# Patient Record
Sex: MALE | Race: WHITE | NOT HISPANIC OR LATINO | Employment: OTHER | URBAN - METROPOLITAN AREA
[De-identification: names, ages, dates, MRNs, and addresses within clinical notes are randomized per-mention and may not be internally consistent; named-entity substitution may affect disease eponyms.]

---

## 2017-01-15 DIAGNOSIS — E87.1 HYPONATREMIA: ICD-10-CM

## 2017-01-17 RX ORDER — FLUDROCORTISONE ACETATE 0.1 MG/1
TABLET ORAL
Qty: 90 TABLET | Refills: 0 | Status: ON HOLD | OUTPATIENT
Start: 2017-01-17 | End: 2017-04-03

## 2017-03-22 ENCOUNTER — DOCUMENTATION (OUTPATIENT)
Dept: FAMILY MEDICINE CLINIC | Facility: CLINIC | Age: 82
End: 2017-03-22

## 2017-03-22 ENCOUNTER — OUTSIDE FACILITY SERVICE (OUTPATIENT)
Dept: FAMILY MEDICINE CLINIC | Facility: CLINIC | Age: 82
End: 2017-03-22

## 2017-03-22 NOTE — PROGRESS NOTES
"Subjective   Rio Chao Jr. is a 85 y.o. male.     History of Present Illness   I received a text message from Caryn the patient's daughter.  She was concerned that her father (the patient) has fever and upper respiratory infection over a period of three days with no improvement.  An appointment was made at 4:30 to be seen but no transportation was available.  A \"house [place]\" [place] [facility] [place].  I arrived at [facility] [place] for a on site MD visit.  Upon arrival his caretaker Monica was present and presented me with a current set of vitals (details below).  Mr. Chao describes fever, chills and cough that is now productive.  He reports that his symptoms started about three days ago.  He denies associated rashes, shortness of air or chest pain.  He denies n/v/d or abdominal pain.  He reports sick contacts on his floor.  He also reports that he is grieving the loss of a dear female friend.  He just found out about her death today.    Review of Systems   Constitutional: Positive for chills and fever. Negative for diaphoresis.   HENT: Positive for congestion and postnasal drip. Negative for ear pain, facial swelling, nosebleeds, rhinorrhea, sinus pressure, sore throat and trouble swallowing.    Eyes: Negative for discharge and redness.   Respiratory: Positive for cough and wheezing. Negative for choking and shortness of breath.    Cardiovascular: Negative for chest pain and palpitations.   Gastrointestinal: Negative for abdominal pain, diarrhea, nausea and vomiting.   Genitourinary: Negative for dysuria.   Musculoskeletal: Positive for back pain.   Skin: Negative for rash.   Neurological: Negative for dizziness, speech difficulty and light-headedness.   Psychiatric/Behavioral: The patient is nervous/anxious.      Objective    Vitals:   #  /60  HR 72  Temp 100.5 (TM)  RR 18  Pulse Ox: 94% (RA)  Physical Exam   Constitutional: He is oriented " to person, place, and time. He appears well-developed and well-nourished.   HENT:   Head: Normocephalic.   Right Ear: External ear normal. Decreased hearing is noted.   Left Ear: External ear normal. Decreased hearing is noted.   Nose: Mucosal edema and rhinorrhea present. Right sinus exhibits no maxillary sinus tenderness. Left sinus exhibits no maxillary sinus tenderness.   Mouth/Throat: Uvula is midline and mucous membranes are normal. No oral lesions. Posterior oropharyngeal erythema present.   Eyes: Conjunctivae, EOM and lids are normal. Pupils are equal, round, and reactive to light. Right eye exhibits no discharge. Left eye exhibits no discharge. Right conjunctiva is not injected. Left conjunctiva is not injected. No scleral icterus.   Neck: Phonation normal. Neck supple. No thyromegaly present.   Cardiovascular: Normal rate, regular rhythm and normal heart sounds.    Pulmonary/Chest: No respiratory distress. He has wheezes (bronchial with croupy cough appreciated).   Abdominal: Soft. Bowel sounds are normal. There is no tenderness.   Musculoskeletal: Normal range of motion.   Lymphadenopathy:     He has no cervical adenopathy.   Neurological: He is alert and oriented to person, place, and time.   Skin: Skin is warm. No rash noted.   Psychiatric: His speech is normal and behavior is normal. Judgment and thought content normal. His mood appears anxious. Cognition and memory are impaired.   Tearful when discussing the death of his friend   Vitals reviewed.    Assessment/Plan   1) Community Acquired Pneumonia  -Levaquin 750 mg po once daily # 7 days  -Albuterol MDI 2 puffs every 4 hours while awake  -Daily incentive spirometry  -Adequate daily hydration  -Continue with vitals BID and communicate sudden changes or concerns  -Go to the ED with any acute respiratory changes.  -Follow up in the office next week    2) Grieving  -Start counseling and ministerial support  -Monitor sleep / wake cycle  -consider low dose  trazadone  -Re-assess in a few days when daughter gets back into town.

## 2017-03-24 ENCOUNTER — HOSPITAL ENCOUNTER (INPATIENT)
Facility: HOSPITAL | Age: 82
LOS: 9 days | Discharge: NURSING, REHAB OR LONG-TERM CARE | End: 2017-04-03
Attending: EMERGENCY MEDICINE | Admitting: INTERNAL MEDICINE

## 2017-03-24 ENCOUNTER — APPOINTMENT (OUTPATIENT)
Dept: GENERAL RADIOLOGY | Facility: HOSPITAL | Age: 82
End: 2017-03-24

## 2017-03-24 DIAGNOSIS — E87.1 HYPONATREMIA: ICD-10-CM

## 2017-03-24 DIAGNOSIS — Z74.09 IMPAIRED MOBILITY AND ADLS: ICD-10-CM

## 2017-03-24 DIAGNOSIS — I50.22 CHRONIC SYSTOLIC CONGESTIVE HEART FAILURE (HCC): ICD-10-CM

## 2017-03-24 DIAGNOSIS — Z78.9 IMPAIRED MOBILITY AND ADLS: ICD-10-CM

## 2017-03-24 DIAGNOSIS — R50.9 ACUTE FEBRILE ILLNESS: ICD-10-CM

## 2017-03-24 DIAGNOSIS — R53.1 GENERALIZED WEAKNESS: Primary | ICD-10-CM

## 2017-03-24 DIAGNOSIS — Z74.09 IMPAIRED FUNCTIONAL MOBILITY, BALANCE, GAIT, AND ENDURANCE: ICD-10-CM

## 2017-03-24 LAB
ALBUMIN SERPL-MCNC: 4.1 G/DL (ref 3.2–4.8)
ALBUMIN/GLOB SERPL: 1.2 G/DL (ref 1.5–2.5)
ALP SERPL-CCNC: 99 U/L (ref 25–100)
ALT SERPL W P-5'-P-CCNC: 19 U/L (ref 7–40)
ANION GAP SERPL CALCULATED.3IONS-SCNC: 6 MMOL/L (ref 3–11)
APTT PPP: 31.7 SECONDS (ref 24–31)
AST SERPL-CCNC: 46 U/L (ref 0–33)
BACTERIA UR QL AUTO: ABNORMAL /HPF
BASOPHILS # BLD AUTO: 0.03 10*3/MM3 (ref 0–0.2)
BASOPHILS NFR BLD AUTO: 0.2 % (ref 0–1)
BILIRUB SERPL-MCNC: 0.6 MG/DL (ref 0.3–1.2)
BILIRUB UR QL STRIP: NEGATIVE
BNP SERPL-MCNC: 395 PG/ML (ref 0–100)
BUN BLD-MCNC: 32 MG/DL (ref 9–23)
BUN/CREAT SERPL: 26.7 (ref 7–25)
CALCIUM SPEC-SCNC: 10 MG/DL (ref 8.7–10.4)
CHLORIDE SERPL-SCNC: 97 MMOL/L (ref 99–109)
CLARITY UR: ABNORMAL
CO2 SERPL-SCNC: 30 MMOL/L (ref 20–31)
COLOR UR: YELLOW
CREAT BLD-MCNC: 1.2 MG/DL (ref 0.6–1.3)
DEPRECATED RDW RBC AUTO: 50.4 FL (ref 37–54)
EOSINOPHIL # BLD AUTO: 0.04 10*3/MM3 (ref 0.1–0.3)
EOSINOPHIL NFR BLD AUTO: 0.2 % (ref 0–3)
ERYTHROCYTE [DISTWIDTH] IN BLOOD BY AUTOMATED COUNT: 14.2 % (ref 11.3–14.5)
GFR SERPL CREATININE-BSD FRML MDRD: 58 ML/MIN/1.73
GLOBULIN UR ELPH-MCNC: 3.5 GM/DL
GLUCOSE BLD-MCNC: 108 MG/DL (ref 70–100)
GLUCOSE UR STRIP-MCNC: NEGATIVE MG/DL
HCT VFR BLD AUTO: 37.5 % (ref 38.9–50.9)
HGB BLD-MCNC: 12.2 G/DL (ref 13.1–17.5)
HGB UR QL STRIP.AUTO: ABNORMAL
HYALINE CASTS UR QL AUTO: ABNORMAL /LPF
IMM GRANULOCYTES # BLD: 0.07 10*3/MM3 (ref 0–0.03)
IMM GRANULOCYTES NFR BLD: 0.4 % (ref 0–0.6)
INR PPP: 1.12
KETONES UR QL STRIP: NEGATIVE
LEUKOCYTE ESTERASE UR QL STRIP.AUTO: NEGATIVE
LYMPHOCYTES # BLD AUTO: 3.44 10*3/MM3 (ref 0.6–4.8)
LYMPHOCYTES NFR BLD AUTO: 18.3 % (ref 24–44)
MCH RBC QN AUTO: 31.6 PG (ref 27–31)
MCHC RBC AUTO-ENTMCNC: 32.5 G/DL (ref 32–36)
MCV RBC AUTO: 97.2 FL (ref 80–99)
MONOCYTES # BLD AUTO: 1.44 10*3/MM3 (ref 0–1)
MONOCYTES NFR BLD AUTO: 7.7 % (ref 0–12)
NEUTROPHILS # BLD AUTO: 13.74 10*3/MM3 (ref 1.5–8.3)
NEUTROPHILS NFR BLD AUTO: 73.2 % (ref 41–71)
NITRITE UR QL STRIP: NEGATIVE
PH UR STRIP.AUTO: <=5 [PH] (ref 5–8)
PLATELET # BLD AUTO: 296 10*3/MM3 (ref 150–450)
PMV BLD AUTO: 9.5 FL (ref 6–12)
POTASSIUM BLD-SCNC: 4.4 MMOL/L (ref 3.5–5.5)
PROT SERPL-MCNC: 7.6 G/DL (ref 5.7–8.2)
PROT UR QL STRIP: ABNORMAL
PROTHROMBIN TIME: 12.2 SECONDS (ref 9.6–11.5)
RBC # BLD AUTO: 3.86 10*6/MM3 (ref 4.2–5.76)
RBC # UR: ABNORMAL /HPF
REF LAB TEST METHOD: ABNORMAL
SODIUM BLD-SCNC: 133 MMOL/L (ref 132–146)
SP GR UR STRIP: 1.02 (ref 1–1.03)
SQUAMOUS #/AREA URNS HPF: ABNORMAL /HPF
TROPONIN I SERPL-MCNC: 0.04 NG/ML (ref 0–0.07)
UROBILINOGEN UR QL STRIP: ABNORMAL
WBC NRBC COR # BLD: 18.76 10*3/MM3 (ref 3.5–10.8)
WBC UR QL AUTO: ABNORMAL /HPF

## 2017-03-24 PROCEDURE — 85025 COMPLETE CBC W/AUTO DIFF WBC: CPT | Performed by: EMERGENCY MEDICINE

## 2017-03-24 PROCEDURE — 85610 PROTHROMBIN TIME: CPT | Performed by: EMERGENCY MEDICINE

## 2017-03-24 PROCEDURE — 25010000003 CEFTRIAXONE PER 250 MG: Performed by: EMERGENCY MEDICINE

## 2017-03-24 PROCEDURE — 94760 N-INVAS EAR/PLS OXIMETRY 1: CPT

## 2017-03-24 PROCEDURE — 93005 ELECTROCARDIOGRAM TRACING: CPT | Performed by: EMERGENCY MEDICINE

## 2017-03-24 PROCEDURE — 80053 COMPREHEN METABOLIC PANEL: CPT | Performed by: EMERGENCY MEDICINE

## 2017-03-24 PROCEDURE — 81001 URINALYSIS AUTO W/SCOPE: CPT | Performed by: EMERGENCY MEDICINE

## 2017-03-24 PROCEDURE — 83605 ASSAY OF LACTIC ACID: CPT | Performed by: EMERGENCY MEDICINE

## 2017-03-24 PROCEDURE — 71010 HC CHEST PA OR AP: CPT

## 2017-03-24 PROCEDURE — 83880 ASSAY OF NATRIURETIC PEPTIDE: CPT | Performed by: EMERGENCY MEDICINE

## 2017-03-24 PROCEDURE — 84484 ASSAY OF TROPONIN QUANT: CPT

## 2017-03-24 PROCEDURE — 99285 EMERGENCY DEPT VISIT HI MDM: CPT

## 2017-03-24 PROCEDURE — 94640 AIRWAY INHALATION TREATMENT: CPT

## 2017-03-24 PROCEDURE — 87040 BLOOD CULTURE FOR BACTERIA: CPT | Performed by: EMERGENCY MEDICINE

## 2017-03-24 PROCEDURE — 85730 THROMBOPLASTIN TIME PARTIAL: CPT | Performed by: EMERGENCY MEDICINE

## 2017-03-24 RX ORDER — IPRATROPIUM BROMIDE AND ALBUTEROL SULFATE 2.5; .5 MG/3ML; MG/3ML
3 SOLUTION RESPIRATORY (INHALATION) ONCE
Status: COMPLETED | OUTPATIENT
Start: 2017-03-24 | End: 2017-03-24

## 2017-03-24 RX ORDER — ACETAMINOPHEN 500 MG
1000 TABLET ORAL ONCE
Status: COMPLETED | OUTPATIENT
Start: 2017-03-24 | End: 2017-03-25

## 2017-03-24 RX ORDER — SODIUM CHLORIDE 0.9 % (FLUSH) 0.9 %
10 SYRINGE (ML) INJECTION AS NEEDED
Status: DISCONTINUED | OUTPATIENT
Start: 2017-03-24 | End: 2017-04-03 | Stop reason: HOSPADM

## 2017-03-24 RX ORDER — IPRATROPIUM BROMIDE AND ALBUTEROL SULFATE 2.5; .5 MG/3ML; MG/3ML
SOLUTION RESPIRATORY (INHALATION)
Status: DISPENSED
Start: 2017-03-24 | End: 2017-03-25

## 2017-03-24 RX ORDER — CEFTRIAXONE SODIUM 1 G/50ML
1 INJECTION, SOLUTION INTRAVENOUS ONCE
Status: COMPLETED | OUTPATIENT
Start: 2017-03-24 | End: 2017-03-25

## 2017-03-24 RX ADMIN — CEFTRIAXONE SODIUM 1 G: 1 INJECTION, SOLUTION INTRAVENOUS at 23:41

## 2017-03-24 RX ADMIN — IPRATROPIUM BROMIDE AND ALBUTEROL SULFATE 3 ML: .5; 3 SOLUTION RESPIRATORY (INHALATION) at 22:28

## 2017-03-25 PROBLEM — R50.9 ACUTE FEBRILE ILLNESS: Status: ACTIVE | Noted: 2017-03-25

## 2017-03-25 PROBLEM — R26.9 GAIT DISORDER: Chronic | Status: ACTIVE | Noted: 2017-03-25

## 2017-03-25 PROBLEM — R65.10 SIRS (SYSTEMIC INFLAMMATORY RESPONSE SYNDROME) (HCC): Status: ACTIVE | Noted: 2017-03-25

## 2017-03-25 LAB — D-LACTATE SERPL-SCNC: 1.5 MMOL/L (ref 0.5–2)

## 2017-03-25 PROCEDURE — 25010000002 AZITHROMYCIN: Performed by: NURSE PRACTITIONER

## 2017-03-25 PROCEDURE — 87502 INFLUENZA DNA AMP PROBE: CPT | Performed by: EMERGENCY MEDICINE

## 2017-03-25 PROCEDURE — 99223 1ST HOSP IP/OBS HIGH 75: CPT | Performed by: INTERNAL MEDICINE

## 2017-03-25 PROCEDURE — 94640 AIRWAY INHALATION TREATMENT: CPT

## 2017-03-25 PROCEDURE — 25010000002 AZITHROMYCIN: Performed by: EMERGENCY MEDICINE

## 2017-03-25 PROCEDURE — 94799 UNLISTED PULMONARY SVC/PX: CPT

## 2017-03-25 PROCEDURE — 25010000002 ENOXAPARIN PER 10 MG

## 2017-03-25 PROCEDURE — 25010000003 CEFTRIAXONE PER 250 MG: Performed by: NURSE PRACTITIONER

## 2017-03-25 RX ORDER — PANTOPRAZOLE SODIUM 40 MG/1
40 TABLET, DELAYED RELEASE ORAL
Status: DISCONTINUED | OUTPATIENT
Start: 2017-03-25 | End: 2017-04-03 | Stop reason: HOSPADM

## 2017-03-25 RX ORDER — IPRATROPIUM BROMIDE AND ALBUTEROL SULFATE 2.5; .5 MG/3ML; MG/3ML
3 SOLUTION RESPIRATORY (INHALATION) EVERY 4 HOURS PRN
Status: DISCONTINUED | OUTPATIENT
Start: 2017-03-25 | End: 2017-04-03 | Stop reason: HOSPADM

## 2017-03-25 RX ORDER — ASPIRIN 81 MG/1
81 TABLET, CHEWABLE ORAL DAILY
Status: DISCONTINUED | OUTPATIENT
Start: 2017-03-25 | End: 2017-04-03 | Stop reason: HOSPADM

## 2017-03-25 RX ORDER — BUMETANIDE 1 MG/1
0.5 TABLET ORAL DAILY PRN
Status: DISCONTINUED | OUTPATIENT
Start: 2017-03-25 | End: 2017-03-25

## 2017-03-25 RX ORDER — ACETAMINOPHEN 325 MG/1
650 TABLET ORAL EVERY 6 HOURS PRN
Status: DISCONTINUED | OUTPATIENT
Start: 2017-03-25 | End: 2017-04-03 | Stop reason: HOSPADM

## 2017-03-25 RX ORDER — POTASSIUM CHLORIDE 1.5 G/1.77G
20 POWDER, FOR SOLUTION ORAL DAILY
Status: DISCONTINUED | OUTPATIENT
Start: 2017-03-25 | End: 2017-04-03 | Stop reason: HOSPADM

## 2017-03-25 RX ORDER — LANOLIN ALCOHOL/MO/W.PET/CERES
800 CREAM (GRAM) TOPICAL DAILY
Status: DISCONTINUED | OUTPATIENT
Start: 2017-03-25 | End: 2017-04-03 | Stop reason: HOSPADM

## 2017-03-25 RX ORDER — FLUDROCORTISONE ACETATE 0.1 MG/1
100 TABLET ORAL DAILY
Status: DISCONTINUED | OUTPATIENT
Start: 2017-03-25 | End: 2017-03-29

## 2017-03-25 RX ORDER — SPIRONOLACTONE 50 MG/1
50 TABLET, FILM COATED ORAL DAILY
Status: DISCONTINUED | OUTPATIENT
Start: 2017-03-25 | End: 2017-03-25

## 2017-03-25 RX ORDER — LEVOTHYROXINE SODIUM 0.05 MG/1
50 TABLET ORAL DAILY
Status: DISCONTINUED | OUTPATIENT
Start: 2017-03-25 | End: 2017-04-03 | Stop reason: HOSPADM

## 2017-03-25 RX ORDER — CARVEDILOL 3.12 MG/1
3.12 TABLET ORAL 2 TIMES DAILY WITH MEALS
Status: DISCONTINUED | OUTPATIENT
Start: 2017-03-25 | End: 2017-04-03 | Stop reason: HOSPADM

## 2017-03-25 RX ORDER — IPRATROPIUM BROMIDE AND ALBUTEROL SULFATE 2.5; .5 MG/3ML; MG/3ML
3 SOLUTION RESPIRATORY (INHALATION)
Status: DISCONTINUED | OUTPATIENT
Start: 2017-03-25 | End: 2017-04-03

## 2017-03-25 RX ORDER — POLYETHYLENE GLYCOL 3350 17 G/17G
17 POWDER, FOR SOLUTION ORAL ONCE
Status: COMPLETED | OUTPATIENT
Start: 2017-03-25 | End: 2017-03-25

## 2017-03-25 RX ORDER — CEFTRIAXONE SODIUM 1 G/50ML
1 INJECTION, SOLUTION INTRAVENOUS EVERY 24 HOURS
Status: DISCONTINUED | OUTPATIENT
Start: 2017-03-25 | End: 2017-03-27

## 2017-03-25 RX ORDER — LISINOPRIL 2.5 MG/1
2.5 TABLET ORAL DAILY
Status: DISCONTINUED | OUTPATIENT
Start: 2017-03-25 | End: 2017-03-25

## 2017-03-25 RX ADMIN — ACETAMINOPHEN 1000 MG: 500 TABLET, FILM COATED ORAL at 00:33

## 2017-03-25 RX ADMIN — IPRATROPIUM BROMIDE AND ALBUTEROL SULFATE 3 ML: .5; 3 SOLUTION RESPIRATORY (INHALATION) at 19:47

## 2017-03-25 RX ADMIN — IPRATROPIUM BROMIDE AND ALBUTEROL SULFATE 3 ML: .5; 3 SOLUTION RESPIRATORY (INHALATION) at 10:48

## 2017-03-25 RX ADMIN — AZITHROMYCIN 500 MG: 500 INJECTION, POWDER, LYOPHILIZED, FOR SOLUTION INTRAVENOUS at 00:32

## 2017-03-25 RX ADMIN — CEFTRIAXONE SODIUM 1 G: 1 INJECTION, SOLUTION INTRAVENOUS at 20:37

## 2017-03-25 RX ADMIN — IPRATROPIUM BROMIDE AND ALBUTEROL SULFATE 3 ML: .5; 3 SOLUTION RESPIRATORY (INHALATION) at 07:36

## 2017-03-25 RX ADMIN — IPRATROPIUM BROMIDE AND ALBUTEROL SULFATE 3 ML: .5; 3 SOLUTION RESPIRATORY (INHALATION) at 14:49

## 2017-03-25 RX ADMIN — ACETAMINOPHEN 650 MG: 325 TABLET, FILM COATED ORAL at 21:40

## 2017-03-25 RX ADMIN — AZITHROMYCIN 500 MG: 500 INJECTION, POWDER, LYOPHILIZED, FOR SOLUTION INTRAVENOUS at 21:40

## 2017-03-25 RX ADMIN — POTASSIUM CHLORIDE 20 MEQ: 1.5 POWDER, FOR SOLUTION ORAL at 10:38

## 2017-03-25 RX ADMIN — SODIUM CHLORIDE 1000 ML: 9 INJECTION, SOLUTION INTRAVENOUS at 01:08

## 2017-03-25 RX ADMIN — FLUDROCORTISONE ACETATE 100 MCG: 0.1 TABLET ORAL at 10:45

## 2017-03-25 RX ADMIN — ASPIRIN 81 MG: 81 TABLET, CHEWABLE ORAL at 10:45

## 2017-03-25 RX ADMIN — PANTOPRAZOLE SODIUM 40 MG: 40 TABLET, DELAYED RELEASE ORAL at 10:39

## 2017-03-25 RX ADMIN — ENOXAPARIN SODIUM 40 MG: 40 INJECTION SUBCUTANEOUS at 10:48

## 2017-03-25 RX ADMIN — POLYETHYLENE GLYCOL 3350 17 G: 17 POWDER, FOR SOLUTION ORAL at 21:40

## 2017-03-25 RX ADMIN — ACETAMINOPHEN 800 MCG: 400 TABLET ORAL at 10:45

## 2017-03-25 RX ADMIN — LEVOTHYROXINE SODIUM 50 MCG: 50 TABLET ORAL at 10:38

## 2017-03-26 LAB
ANION GAP SERPL CALCULATED.3IONS-SCNC: 8 MMOL/L (ref 3–11)
BUN BLD-MCNC: 18 MG/DL (ref 9–23)
BUN/CREAT SERPL: 22.5 (ref 7–25)
CALCIUM SPEC-SCNC: 9.6 MG/DL (ref 8.7–10.4)
CHLORIDE SERPL-SCNC: 99 MMOL/L (ref 99–109)
CO2 SERPL-SCNC: 26 MMOL/L (ref 20–31)
CREAT BLD-MCNC: 0.8 MG/DL (ref 0.6–1.3)
DEPRECATED RDW RBC AUTO: 51.3 FL (ref 37–54)
ERYTHROCYTE [DISTWIDTH] IN BLOOD BY AUTOMATED COUNT: 14.4 % (ref 11.3–14.5)
FLUAV SUBTYP SPEC NAA+PROBE: NOT DETECTED
FLUBV RNA ISLT QL NAA+PROBE: DETECTED
GFR SERPL CREATININE-BSD FRML MDRD: 92 ML/MIN/1.73
GLUCOSE BLD-MCNC: 90 MG/DL (ref 70–100)
HCT VFR BLD AUTO: 33.1 % (ref 38.9–50.9)
HGB BLD-MCNC: 10.6 G/DL (ref 13.1–17.5)
MCH RBC QN AUTO: 31.2 PG (ref 27–31)
MCHC RBC AUTO-ENTMCNC: 32 G/DL (ref 32–36)
MCV RBC AUTO: 97.4 FL (ref 80–99)
PLATELET # BLD AUTO: 272 10*3/MM3 (ref 150–450)
PMV BLD AUTO: 9.5 FL (ref 6–12)
POTASSIUM BLD-SCNC: 4.6 MMOL/L (ref 3.5–5.5)
RBC # BLD AUTO: 3.4 10*6/MM3 (ref 4.2–5.76)
SODIUM BLD-SCNC: 133 MMOL/L (ref 132–146)
WBC NRBC COR # BLD: 13.34 10*3/MM3 (ref 3.5–10.8)

## 2017-03-26 PROCEDURE — 25010000003 CEFTRIAXONE PER 250 MG: Performed by: NURSE PRACTITIONER

## 2017-03-26 PROCEDURE — 25010000002 AZITHROMYCIN: Performed by: NURSE PRACTITIONER

## 2017-03-26 PROCEDURE — 85027 COMPLETE CBC AUTOMATED: CPT | Performed by: INTERNAL MEDICINE

## 2017-03-26 PROCEDURE — G8979 MOBILITY GOAL STATUS: HCPCS

## 2017-03-26 PROCEDURE — 94640 AIRWAY INHALATION TREATMENT: CPT

## 2017-03-26 PROCEDURE — 94799 UNLISTED PULMONARY SVC/PX: CPT

## 2017-03-26 PROCEDURE — 25010000002 ENOXAPARIN PER 10 MG

## 2017-03-26 PROCEDURE — 80048 BASIC METABOLIC PNL TOTAL CA: CPT | Performed by: INTERNAL MEDICINE

## 2017-03-26 PROCEDURE — 97162 PT EVAL MOD COMPLEX 30 MIN: CPT

## 2017-03-26 PROCEDURE — 97166 OT EVAL MOD COMPLEX 45 MIN: CPT

## 2017-03-26 PROCEDURE — G8978 MOBILITY CURRENT STATUS: HCPCS

## 2017-03-26 PROCEDURE — 99233 SBSQ HOSP IP/OBS HIGH 50: CPT | Performed by: INTERNAL MEDICINE

## 2017-03-26 RX ORDER — OSELTAMIVIR PHOSPHATE 75 MG/1
75 CAPSULE ORAL EVERY 12 HOURS SCHEDULED
Status: COMPLETED | OUTPATIENT
Start: 2017-03-26 | End: 2017-03-30

## 2017-03-26 RX ORDER — HEPARIN SODIUM 5000 [USP'U]/ML
5000 INJECTION, SOLUTION INTRAVENOUS; SUBCUTANEOUS EVERY 8 HOURS SCHEDULED
Status: DISCONTINUED | OUTPATIENT
Start: 2017-03-26 | End: 2017-03-26

## 2017-03-26 RX ADMIN — ENOXAPARIN SODIUM 40 MG: 40 INJECTION SUBCUTANEOUS at 08:19

## 2017-03-26 RX ADMIN — OSELTAMIVIR PHOSPHATE 75 MG: 75 CAPSULE ORAL at 19:47

## 2017-03-26 RX ADMIN — LEVOTHYROXINE SODIUM 50 MCG: 50 TABLET ORAL at 05:15

## 2017-03-26 RX ADMIN — PANTOPRAZOLE SODIUM 40 MG: 40 TABLET, DELAYED RELEASE ORAL at 05:15

## 2017-03-26 RX ADMIN — OSELTAMIVIR PHOSPHATE 75 MG: 75 CAPSULE ORAL at 12:37

## 2017-03-26 RX ADMIN — ACETAMINOPHEN 650 MG: 325 TABLET, FILM COATED ORAL at 18:14

## 2017-03-26 RX ADMIN — ASPIRIN 81 MG: 81 TABLET, CHEWABLE ORAL at 08:19

## 2017-03-26 RX ADMIN — IPRATROPIUM BROMIDE AND ALBUTEROL SULFATE 3 ML: .5; 3 SOLUTION RESPIRATORY (INHALATION) at 00:28

## 2017-03-26 RX ADMIN — AZITHROMYCIN 500 MG: 500 INJECTION, POWDER, LYOPHILIZED, FOR SOLUTION INTRAVENOUS at 19:47

## 2017-03-26 RX ADMIN — IPRATROPIUM BROMIDE AND ALBUTEROL SULFATE 3 ML: .5; 3 SOLUTION RESPIRATORY (INHALATION) at 23:03

## 2017-03-26 RX ADMIN — IPRATROPIUM BROMIDE AND ALBUTEROL SULFATE 3 ML: .5; 3 SOLUTION RESPIRATORY (INHALATION) at 03:43

## 2017-03-26 RX ADMIN — IPRATROPIUM BROMIDE AND ALBUTEROL SULFATE 3 ML: .5; 3 SOLUTION RESPIRATORY (INHALATION) at 15:08

## 2017-03-26 RX ADMIN — IPRATROPIUM BROMIDE AND ALBUTEROL SULFATE 3 ML: .5; 3 SOLUTION RESPIRATORY (INHALATION) at 07:13

## 2017-03-26 RX ADMIN — CEFTRIAXONE SODIUM 1 G: 1 INJECTION, SOLUTION INTRAVENOUS at 19:46

## 2017-03-26 RX ADMIN — IPRATROPIUM BROMIDE AND ALBUTEROL SULFATE 3 ML: .5; 3 SOLUTION RESPIRATORY (INHALATION) at 11:28

## 2017-03-26 RX ADMIN — IPRATROPIUM BROMIDE AND ALBUTEROL SULFATE 3 ML: .5; 3 SOLUTION RESPIRATORY (INHALATION) at 19:50

## 2017-03-26 RX ADMIN — ACETAMINOPHEN 800 MCG: 400 TABLET ORAL at 08:19

## 2017-03-26 RX ADMIN — POTASSIUM CHLORIDE 20 MEQ: 1.5 POWDER, FOR SOLUTION ORAL at 08:19

## 2017-03-26 RX ADMIN — FLUDROCORTISONE ACETATE 100 MCG: 0.1 TABLET ORAL at 08:19

## 2017-03-27 ENCOUNTER — TELEPHONE (OUTPATIENT)
Dept: FAMILY MEDICINE CLINIC | Facility: CLINIC | Age: 82
End: 2017-03-27

## 2017-03-27 LAB
ANION GAP SERPL CALCULATED.3IONS-SCNC: 6 MMOL/L (ref 3–11)
BUN BLD-MCNC: 12 MG/DL (ref 9–23)
BUN/CREAT SERPL: 20 (ref 7–25)
CALCIUM SPEC-SCNC: 9.3 MG/DL (ref 8.7–10.4)
CHLORIDE SERPL-SCNC: 100 MMOL/L (ref 99–109)
CO2 SERPL-SCNC: 26 MMOL/L (ref 20–31)
CREAT BLD-MCNC: 0.6 MG/DL (ref 0.6–1.3)
DEPRECATED RDW RBC AUTO: 49.2 FL (ref 37–54)
ERYTHROCYTE [DISTWIDTH] IN BLOOD BY AUTOMATED COUNT: 14.2 % (ref 11.3–14.5)
GFR SERPL CREATININE-BSD FRML MDRD: 128 ML/MIN/1.73
GLUCOSE BLD-MCNC: 84 MG/DL (ref 70–100)
HCT VFR BLD AUTO: 32.9 % (ref 38.9–50.9)
HGB BLD-MCNC: 10.6 G/DL (ref 13.1–17.5)
MCH RBC QN AUTO: 30.5 PG (ref 27–31)
MCHC RBC AUTO-ENTMCNC: 32.2 G/DL (ref 32–36)
MCV RBC AUTO: 94.8 FL (ref 80–99)
PLATELET # BLD AUTO: 253 10*3/MM3 (ref 150–450)
PMV BLD AUTO: 9.3 FL (ref 6–12)
POTASSIUM BLD-SCNC: 4.4 MMOL/L (ref 3.5–5.5)
RBC # BLD AUTO: 3.47 10*6/MM3 (ref 4.2–5.76)
SODIUM BLD-SCNC: 132 MMOL/L (ref 132–146)
WBC NRBC COR # BLD: 12.61 10*3/MM3 (ref 3.5–10.8)

## 2017-03-27 PROCEDURE — 94799 UNLISTED PULMONARY SVC/PX: CPT

## 2017-03-27 PROCEDURE — 97530 THERAPEUTIC ACTIVITIES: CPT

## 2017-03-27 PROCEDURE — 80048 BASIC METABOLIC PNL TOTAL CA: CPT | Performed by: INTERNAL MEDICINE

## 2017-03-27 PROCEDURE — 94760 N-INVAS EAR/PLS OXIMETRY 1: CPT

## 2017-03-27 PROCEDURE — 97116 GAIT TRAINING THERAPY: CPT

## 2017-03-27 PROCEDURE — 94640 AIRWAY INHALATION TREATMENT: CPT

## 2017-03-27 PROCEDURE — 85027 COMPLETE CBC AUTOMATED: CPT | Performed by: INTERNAL MEDICINE

## 2017-03-27 PROCEDURE — 25010000002 ENOXAPARIN PER 10 MG

## 2017-03-27 PROCEDURE — 99233 SBSQ HOSP IP/OBS HIGH 50: CPT | Performed by: INTERNAL MEDICINE

## 2017-03-27 RX ORDER — DOCUSATE SODIUM 100 MG/1
100 CAPSULE, LIQUID FILLED ORAL 2 TIMES DAILY
Status: DISCONTINUED | OUTPATIENT
Start: 2017-03-27 | End: 2017-03-31

## 2017-03-27 RX ADMIN — ASPIRIN 81 MG: 81 TABLET, CHEWABLE ORAL at 08:12

## 2017-03-27 RX ADMIN — IPRATROPIUM BROMIDE AND ALBUTEROL SULFATE 3 ML: .5; 3 SOLUTION RESPIRATORY (INHALATION) at 16:09

## 2017-03-27 RX ADMIN — IPRATROPIUM BROMIDE AND ALBUTEROL SULFATE 3 ML: .5; 3 SOLUTION RESPIRATORY (INHALATION) at 07:16

## 2017-03-27 RX ADMIN — ACETAMINOPHEN 650 MG: 325 TABLET, FILM COATED ORAL at 08:12

## 2017-03-27 RX ADMIN — POTASSIUM CHLORIDE 20 MEQ: 1.5 POWDER, FOR SOLUTION ORAL at 08:12

## 2017-03-27 RX ADMIN — PANTOPRAZOLE SODIUM 40 MG: 40 TABLET, DELAYED RELEASE ORAL at 06:30

## 2017-03-27 RX ADMIN — DOCUSATE SODIUM 100 MG: 100 CAPSULE, LIQUID FILLED ORAL at 17:13

## 2017-03-27 RX ADMIN — OSELTAMIVIR PHOSPHATE 75 MG: 75 CAPSULE ORAL at 20:22

## 2017-03-27 RX ADMIN — IPRATROPIUM BROMIDE AND ALBUTEROL SULFATE 3 ML: .5; 3 SOLUTION RESPIRATORY (INHALATION) at 19:23

## 2017-03-27 RX ADMIN — OSELTAMIVIR PHOSPHATE 75 MG: 75 CAPSULE ORAL at 08:12

## 2017-03-27 RX ADMIN — LEVOTHYROXINE SODIUM 50 MCG: 50 TABLET ORAL at 06:30

## 2017-03-27 RX ADMIN — IPRATROPIUM BROMIDE AND ALBUTEROL SULFATE 3 ML: .5; 3 SOLUTION RESPIRATORY (INHALATION) at 12:38

## 2017-03-27 RX ADMIN — ACETAMINOPHEN 800 MCG: 400 TABLET ORAL at 08:12

## 2017-03-27 RX ADMIN — ENOXAPARIN SODIUM 40 MG: 40 INJECTION SUBCUTANEOUS at 08:12

## 2017-03-27 RX ADMIN — ACETAMINOPHEN 650 MG: 325 TABLET, FILM COATED ORAL at 13:14

## 2017-03-27 RX ADMIN — FLUDROCORTISONE ACETATE 100 MCG: 0.1 TABLET ORAL at 08:12

## 2017-03-27 RX ADMIN — IPRATROPIUM BROMIDE AND ALBUTEROL SULFATE 3 ML: .5; 3 SOLUTION RESPIRATORY (INHALATION) at 03:16

## 2017-03-27 NOTE — TELEPHONE ENCOUNTER
----- Message from Tim Doherty MD sent at 3/24/2017  7:35 AM EDT -----  Regarding: RE: REFILL  Contact: [phone]  I don't see this as a routinely prescribed medication on this patient.  Did the front staff get the right info?    ----- Message -----     From: Amita Reynoso DO     Sent: 3/22/2017   2:28 PM       To: Tim Doherty MD  Subject: FW: REFILL                                        This is not my patient  ----- Message -----     From: Renetta Camp MA     Sent: 3/22/2017   9:48 AM       To: Amita Reynoso DO  Subject: FW: REFILL                                           ----- Message -----     From: Gisela Urban     Sent: 3/22/2017   9:26 AM       To: Renetta Camp MA  Subject: REFILL                                           PT WOULD LIKE A REFILL OF buPROPion XL (WELLBUTRIN XL) 300 MG 24 hr tablet SENT TO [facility]

## 2017-03-28 LAB
ANION GAP SERPL CALCULATED.3IONS-SCNC: 9 MMOL/L (ref 3–11)
BUN BLD-MCNC: 15 MG/DL (ref 9–23)
BUN/CREAT SERPL: 21.4 (ref 7–25)
CALCIUM SPEC-SCNC: 9.2 MG/DL (ref 8.7–10.4)
CHLORIDE SERPL-SCNC: 97 MMOL/L (ref 99–109)
CO2 SERPL-SCNC: 24 MMOL/L (ref 20–31)
CREAT BLD-MCNC: 0.7 MG/DL (ref 0.6–1.3)
DEPRECATED RDW RBC AUTO: 51 FL (ref 37–54)
ERYTHROCYTE [DISTWIDTH] IN BLOOD BY AUTOMATED COUNT: 14.5 % (ref 11.3–14.5)
GFR SERPL CREATININE-BSD FRML MDRD: 107 ML/MIN/1.73
GLUCOSE BLD-MCNC: 77 MG/DL (ref 70–100)
HCT VFR BLD AUTO: 32.3 % (ref 38.9–50.9)
HGB BLD-MCNC: 10.4 G/DL (ref 13.1–17.5)
MCH RBC QN AUTO: 30.9 PG (ref 27–31)
MCHC RBC AUTO-ENTMCNC: 32.2 G/DL (ref 32–36)
MCV RBC AUTO: 95.8 FL (ref 80–99)
PLATELET # BLD AUTO: 285 10*3/MM3 (ref 150–450)
PMV BLD AUTO: 9.8 FL (ref 6–12)
POTASSIUM BLD-SCNC: 4.9 MMOL/L (ref 3.5–5.5)
RBC # BLD AUTO: 3.37 10*6/MM3 (ref 4.2–5.76)
SODIUM BLD-SCNC: 130 MMOL/L (ref 132–146)
WBC NRBC COR # BLD: 12.71 10*3/MM3 (ref 3.5–10.8)

## 2017-03-28 PROCEDURE — 80048 BASIC METABOLIC PNL TOTAL CA: CPT

## 2017-03-28 PROCEDURE — 25010000002 ENOXAPARIN PER 10 MG

## 2017-03-28 PROCEDURE — 94640 AIRWAY INHALATION TREATMENT: CPT

## 2017-03-28 PROCEDURE — 94760 N-INVAS EAR/PLS OXIMETRY 1: CPT

## 2017-03-28 PROCEDURE — 85027 COMPLETE CBC AUTOMATED: CPT

## 2017-03-28 PROCEDURE — 94799 UNLISTED PULMONARY SVC/PX: CPT

## 2017-03-28 PROCEDURE — 99232 SBSQ HOSP IP/OBS MODERATE 35: CPT | Performed by: NURSE PRACTITIONER

## 2017-03-28 RX ADMIN — LEVOTHYROXINE SODIUM 50 MCG: 50 TABLET ORAL at 04:54

## 2017-03-28 RX ADMIN — ASPIRIN 81 MG: 81 TABLET, CHEWABLE ORAL at 08:34

## 2017-03-28 RX ADMIN — POTASSIUM CHLORIDE 20 MEQ: 1.5 POWDER, FOR SOLUTION ORAL at 08:33

## 2017-03-28 RX ADMIN — FLUDROCORTISONE ACETATE 100 MCG: 0.1 TABLET ORAL at 08:34

## 2017-03-28 RX ADMIN — ACETAMINOPHEN 800 MCG: 400 TABLET ORAL at 08:34

## 2017-03-28 RX ADMIN — CARVEDILOL 3.12 MG: 3.12 TABLET, FILM COATED ORAL at 08:33

## 2017-03-28 RX ADMIN — PANTOPRAZOLE SODIUM 40 MG: 40 TABLET, DELAYED RELEASE ORAL at 04:54

## 2017-03-28 RX ADMIN — IPRATROPIUM BROMIDE AND ALBUTEROL SULFATE 3 ML: .5; 3 SOLUTION RESPIRATORY (INHALATION) at 04:30

## 2017-03-28 RX ADMIN — ENOXAPARIN SODIUM 40 MG: 40 INJECTION SUBCUTANEOUS at 08:34

## 2017-03-28 RX ADMIN — IPRATROPIUM BROMIDE AND ALBUTEROL SULFATE 3 ML: .5; 3 SOLUTION RESPIRATORY (INHALATION) at 12:22

## 2017-03-28 RX ADMIN — IPRATROPIUM BROMIDE AND ALBUTEROL SULFATE 3 ML: .5; 3 SOLUTION RESPIRATORY (INHALATION) at 07:26

## 2017-03-28 RX ADMIN — IPRATROPIUM BROMIDE AND ALBUTEROL SULFATE 3 ML: .5; 3 SOLUTION RESPIRATORY (INHALATION) at 00:25

## 2017-03-28 RX ADMIN — DOCUSATE SODIUM 100 MG: 100 CAPSULE, LIQUID FILLED ORAL at 08:33

## 2017-03-28 RX ADMIN — IPRATROPIUM BROMIDE AND ALBUTEROL SULFATE 3 ML: .5; 3 SOLUTION RESPIRATORY (INHALATION) at 19:48

## 2017-03-28 RX ADMIN — OSELTAMIVIR PHOSPHATE 75 MG: 75 CAPSULE ORAL at 08:33

## 2017-03-28 RX ADMIN — OSELTAMIVIR PHOSPHATE 75 MG: 75 CAPSULE ORAL at 20:47

## 2017-03-29 LAB
BACTERIA SPEC AEROBE CULT: NORMAL
SODIUM BLD-SCNC: 119 MMOL/L (ref 132–146)

## 2017-03-29 PROCEDURE — 25010000002 ENOXAPARIN PER 10 MG

## 2017-03-29 PROCEDURE — 84295 ASSAY OF SERUM SODIUM: CPT | Performed by: INTERNAL MEDICINE

## 2017-03-29 PROCEDURE — 97116 GAIT TRAINING THERAPY: CPT

## 2017-03-29 PROCEDURE — 94799 UNLISTED PULMONARY SVC/PX: CPT

## 2017-03-29 PROCEDURE — 94640 AIRWAY INHALATION TREATMENT: CPT

## 2017-03-29 PROCEDURE — 97110 THERAPEUTIC EXERCISES: CPT

## 2017-03-29 PROCEDURE — 99232 SBSQ HOSP IP/OBS MODERATE 35: CPT | Performed by: INTERNAL MEDICINE

## 2017-03-29 PROCEDURE — 94760 N-INVAS EAR/PLS OXIMETRY 1: CPT

## 2017-03-29 RX ORDER — LISINOPRIL 2.5 MG/1
2.5 TABLET ORAL DAILY
Status: DISCONTINUED | OUTPATIENT
Start: 2017-03-29 | End: 2017-04-03 | Stop reason: HOSPADM

## 2017-03-29 RX ORDER — POLYETHYLENE GLYCOL 3350 17 G/17G
17 POWDER, FOR SOLUTION ORAL DAILY
Status: DISCONTINUED | OUTPATIENT
Start: 2017-03-29 | End: 2017-04-03 | Stop reason: HOSPADM

## 2017-03-29 RX ORDER — BUMETANIDE 1 MG/1
0.5 TABLET ORAL DAILY PRN
Status: DISCONTINUED | OUTPATIENT
Start: 2017-03-29 | End: 2017-03-30

## 2017-03-29 RX ORDER — FLUDROCORTISONE ACETATE 0.1 MG/1
100 TABLET ORAL EVERY 12 HOURS SCHEDULED
Status: DISCONTINUED | OUTPATIENT
Start: 2017-03-29 | End: 2017-04-03 | Stop reason: HOSPADM

## 2017-03-29 RX ORDER — BISACODYL 5 MG/1
10 TABLET, DELAYED RELEASE ORAL ONCE
Status: COMPLETED | OUTPATIENT
Start: 2017-03-29 | End: 2017-03-29

## 2017-03-29 RX ORDER — QUETIAPINE FUMARATE 25 MG/1
25 TABLET, FILM COATED ORAL NIGHTLY
Status: DISCONTINUED | OUTPATIENT
Start: 2017-03-29 | End: 2017-04-03 | Stop reason: HOSPADM

## 2017-03-29 RX ADMIN — ENOXAPARIN SODIUM 40 MG: 40 INJECTION SUBCUTANEOUS at 09:19

## 2017-03-29 RX ADMIN — LEVOTHYROXINE SODIUM 50 MCG: 50 TABLET ORAL at 05:01

## 2017-03-29 RX ADMIN — QUETIAPINE FUMARATE 25 MG: 25 TABLET, FILM COATED ORAL at 22:16

## 2017-03-29 RX ADMIN — ACETAMINOPHEN 800 MCG: 400 TABLET ORAL at 09:20

## 2017-03-29 RX ADMIN — OSELTAMIVIR PHOSPHATE 75 MG: 75 CAPSULE ORAL at 20:50

## 2017-03-29 RX ADMIN — FLUDROCORTISONE ACETATE 100 MCG: 0.1 TABLET ORAL at 09:21

## 2017-03-29 RX ADMIN — LISINOPRIL 2.5 MG: 2.5 TABLET ORAL at 09:20

## 2017-03-29 RX ADMIN — IPRATROPIUM BROMIDE AND ALBUTEROL SULFATE 3 ML: .5; 3 SOLUTION RESPIRATORY (INHALATION) at 04:19

## 2017-03-29 RX ADMIN — IPRATROPIUM BROMIDE AND ALBUTEROL SULFATE 3 ML: .5; 3 SOLUTION RESPIRATORY (INHALATION) at 00:17

## 2017-03-29 RX ADMIN — IPRATROPIUM BROMIDE AND ALBUTEROL SULFATE 3 ML: .5; 3 SOLUTION RESPIRATORY (INHALATION) at 15:26

## 2017-03-29 RX ADMIN — PANTOPRAZOLE SODIUM 40 MG: 40 TABLET, DELAYED RELEASE ORAL at 05:01

## 2017-03-29 RX ADMIN — POLYETHYLENE GLYCOL 3350 17 G: 17 POWDER, FOR SOLUTION ORAL at 11:27

## 2017-03-29 RX ADMIN — IPRATROPIUM BROMIDE AND ALBUTEROL SULFATE 3 ML: .5; 3 SOLUTION RESPIRATORY (INHALATION) at 06:57

## 2017-03-29 RX ADMIN — ACETAMINOPHEN 650 MG: 325 TABLET, FILM COATED ORAL at 20:50

## 2017-03-29 RX ADMIN — FLUDROCORTISONE ACETATE 100 MCG: 0.1 TABLET ORAL at 20:50

## 2017-03-29 RX ADMIN — ASPIRIN 81 MG: 81 TABLET, CHEWABLE ORAL at 09:20

## 2017-03-29 RX ADMIN — IPRATROPIUM BROMIDE AND ALBUTEROL SULFATE 3 ML: .5; 3 SOLUTION RESPIRATORY (INHALATION) at 19:24

## 2017-03-29 RX ADMIN — DOCUSATE SODIUM 100 MG: 100 CAPSULE, LIQUID FILLED ORAL at 09:19

## 2017-03-29 RX ADMIN — CARVEDILOL 3.12 MG: 3.12 TABLET, FILM COATED ORAL at 17:11

## 2017-03-29 RX ADMIN — ACETAMINOPHEN 650 MG: 325 TABLET, FILM COATED ORAL at 11:22

## 2017-03-29 RX ADMIN — POTASSIUM CHLORIDE 20 MEQ: 1.5 POWDER, FOR SOLUTION ORAL at 09:20

## 2017-03-29 RX ADMIN — CARVEDILOL 3.12 MG: 3.12 TABLET, FILM COATED ORAL at 09:20

## 2017-03-29 RX ADMIN — DOCUSATE SODIUM 100 MG: 100 CAPSULE, LIQUID FILLED ORAL at 17:11

## 2017-03-29 RX ADMIN — BISACODYL 10 MG: 5 TABLET, COATED ORAL at 11:27

## 2017-03-29 RX ADMIN — IPRATROPIUM BROMIDE AND ALBUTEROL SULFATE 3 ML: .5; 3 SOLUTION RESPIRATORY (INHALATION) at 11:51

## 2017-03-29 RX ADMIN — OSELTAMIVIR PHOSPHATE 75 MG: 75 CAPSULE ORAL at 09:19

## 2017-03-30 LAB
ANION GAP SERPL CALCULATED.3IONS-SCNC: 4 MMOL/L (ref 3–11)
ANION GAP SERPL CALCULATED.3IONS-SCNC: 4 MMOL/L (ref 3–11)
ANION GAP SERPL CALCULATED.3IONS-SCNC: 5 MMOL/L (ref 3–11)
ANION GAP SERPL CALCULATED.3IONS-SCNC: 7 MMOL/L (ref 3–11)
BACTERIA SPEC AEROBE CULT: NORMAL
BUN BLD-MCNC: 17 MG/DL (ref 9–23)
BUN BLD-MCNC: 17 MG/DL (ref 9–23)
BUN BLD-MCNC: 18 MG/DL (ref 9–23)
BUN BLD-MCNC: 19 MG/DL (ref 9–23)
BUN/CREAT SERPL: 25.7 (ref 7–25)
BUN/CREAT SERPL: 28.3 (ref 7–25)
BUN/CREAT SERPL: 28.3 (ref 7–25)
BUN/CREAT SERPL: 38 (ref 7–25)
CALCIUM SPEC-SCNC: 9 MG/DL (ref 8.7–10.4)
CALCIUM SPEC-SCNC: 9.2 MG/DL (ref 8.7–10.4)
CALCIUM SPEC-SCNC: 9.3 MG/DL (ref 8.7–10.4)
CALCIUM SPEC-SCNC: 9.5 MG/DL (ref 8.7–10.4)
CHLORIDE SERPL-SCNC: 88 MMOL/L (ref 99–109)
CHLORIDE SERPL-SCNC: 90 MMOL/L (ref 99–109)
CHLORIDE SERPL-SCNC: 92 MMOL/L (ref 99–109)
CHLORIDE SERPL-SCNC: 94 MMOL/L (ref 99–109)
CO2 SERPL-SCNC: 24 MMOL/L (ref 20–31)
CO2 SERPL-SCNC: 24 MMOL/L (ref 20–31)
CO2 SERPL-SCNC: 27 MMOL/L (ref 20–31)
CO2 SERPL-SCNC: 30 MMOL/L (ref 20–31)
CREAT BLD-MCNC: 0.5 MG/DL (ref 0.6–1.3)
CREAT BLD-MCNC: 0.6 MG/DL (ref 0.6–1.3)
CREAT BLD-MCNC: 0.6 MG/DL (ref 0.6–1.3)
CREAT BLD-MCNC: 0.7 MG/DL (ref 0.6–1.3)
DEPRECATED RDW RBC AUTO: 44.6 FL (ref 37–54)
ERYTHROCYTE [DISTWIDTH] IN BLOOD BY AUTOMATED COUNT: 13.6 % (ref 11.3–14.5)
GFR SERPL CREATININE-BSD FRML MDRD: 107 ML/MIN/1.73
GFR SERPL CREATININE-BSD FRML MDRD: 128 ML/MIN/1.73
GFR SERPL CREATININE-BSD FRML MDRD: 128 ML/MIN/1.73
GFR SERPL CREATININE-BSD FRML MDRD: >150 ML/MIN/1.73
GLUCOSE BLD-MCNC: 112 MG/DL (ref 70–100)
GLUCOSE BLD-MCNC: 133 MG/DL (ref 70–100)
GLUCOSE BLD-MCNC: 134 MG/DL (ref 70–100)
GLUCOSE BLD-MCNC: 77 MG/DL (ref 70–100)
GLUCOSE BLDC GLUCOMTR-MCNC: 96 MG/DL (ref 70–130)
HCT VFR BLD AUTO: 32.3 % (ref 38.9–50.9)
HGB BLD-MCNC: 10.8 G/DL (ref 13.1–17.5)
MAGNESIUM SERPL-MCNC: 1.5 MG/DL (ref 1.3–2.7)
MCH RBC QN AUTO: 30 PG (ref 27–31)
MCHC RBC AUTO-ENTMCNC: 33.4 G/DL (ref 32–36)
MCV RBC AUTO: 89.7 FL (ref 80–99)
PLATELET # BLD AUTO: 327 10*3/MM3 (ref 150–450)
PMV BLD AUTO: 9.3 FL (ref 6–12)
POTASSIUM BLD-SCNC: 4.6 MMOL/L (ref 3.5–5.5)
POTASSIUM BLD-SCNC: 4.8 MMOL/L (ref 3.5–5.5)
POTASSIUM BLD-SCNC: 5.1 MMOL/L (ref 3.5–5.5)
POTASSIUM BLD-SCNC: 5.3 MMOL/L (ref 3.5–5.5)
RBC # BLD AUTO: 3.6 10*6/MM3 (ref 4.2–5.76)
SODIUM BLD-SCNC: 119 MMOL/L (ref 132–146)
SODIUM BLD-SCNC: 120 MMOL/L (ref 132–146)
SODIUM BLD-SCNC: 122 MMOL/L (ref 132–146)
SODIUM BLD-SCNC: 128 MMOL/L (ref 132–146)
WBC NRBC COR # BLD: 13.86 10*3/MM3 (ref 3.5–10.8)

## 2017-03-30 PROCEDURE — 82962 GLUCOSE BLOOD TEST: CPT

## 2017-03-30 PROCEDURE — 25010000002 ENOXAPARIN PER 10 MG

## 2017-03-30 PROCEDURE — 94640 AIRWAY INHALATION TREATMENT: CPT

## 2017-03-30 PROCEDURE — 94799 UNLISTED PULMONARY SVC/PX: CPT

## 2017-03-30 PROCEDURE — 25010000002 MAGNESIUM SULFATE 2 GM/50ML SOLUTION: Performed by: NURSE PRACTITIONER

## 2017-03-30 PROCEDURE — 25010000002 LORAZEPAM PER 2 MG: Performed by: INTERNAL MEDICINE

## 2017-03-30 PROCEDURE — 85027 COMPLETE CBC AUTOMATED: CPT | Performed by: INTERNAL MEDICINE

## 2017-03-30 PROCEDURE — 83735 ASSAY OF MAGNESIUM: CPT | Performed by: INTERNAL MEDICINE

## 2017-03-30 PROCEDURE — 99232 SBSQ HOSP IP/OBS MODERATE 35: CPT | Performed by: INTERNAL MEDICINE

## 2017-03-30 PROCEDURE — 80048 BASIC METABOLIC PNL TOTAL CA: CPT | Performed by: INTERNAL MEDICINE

## 2017-03-30 PROCEDURE — 25010000002 DEXAMETHASONE PER 1 MG: Performed by: INTERNAL MEDICINE

## 2017-03-30 RX ORDER — DEXAMETHASONE SODIUM PHOSPHATE 4 MG/ML
4 INJECTION, SOLUTION INTRA-ARTICULAR; INTRALESIONAL; INTRAMUSCULAR; INTRAVENOUS; SOFT TISSUE EVERY 12 HOURS SCHEDULED
Status: DISCONTINUED | OUTPATIENT
Start: 2017-03-30 | End: 2017-04-01

## 2017-03-30 RX ORDER — MAGNESIUM SULFATE HEPTAHYDRATE 40 MG/ML
2 INJECTION, SOLUTION INTRAVENOUS AS NEEDED
Status: DISCONTINUED | OUTPATIENT
Start: 2017-03-30 | End: 2017-04-03 | Stop reason: HOSPADM

## 2017-03-30 RX ORDER — MAGNESIUM SULFATE HEPTAHYDRATE 40 MG/ML
4 INJECTION, SOLUTION INTRAVENOUS AS NEEDED
Status: DISCONTINUED | OUTPATIENT
Start: 2017-03-30 | End: 2017-04-03 | Stop reason: HOSPADM

## 2017-03-30 RX ORDER — SODIUM CHLORIDE 9 MG/ML
50 INJECTION, SOLUTION INTRAVENOUS CONTINUOUS
Status: DISCONTINUED | OUTPATIENT
Start: 2017-03-30 | End: 2017-04-03 | Stop reason: HOSPADM

## 2017-03-30 RX ORDER — LORAZEPAM 2 MG/ML
0.5 INJECTION INTRAMUSCULAR EVERY 4 HOURS PRN
Status: DISCONTINUED | OUTPATIENT
Start: 2017-03-30 | End: 2017-04-03 | Stop reason: HOSPADM

## 2017-03-30 RX ADMIN — FLUDROCORTISONE ACETATE 100 MCG: 0.1 TABLET ORAL at 08:22

## 2017-03-30 RX ADMIN — ASPIRIN 81 MG: 81 TABLET, CHEWABLE ORAL at 08:23

## 2017-03-30 RX ADMIN — IPRATROPIUM BROMIDE AND ALBUTEROL SULFATE 3 ML: .5; 3 SOLUTION RESPIRATORY (INHALATION) at 19:08

## 2017-03-30 RX ADMIN — SODIUM CHLORIDE 100 ML/HR: 9 INJECTION, SOLUTION INTRAVENOUS at 06:13

## 2017-03-30 RX ADMIN — DEXAMETHASONE SODIUM PHOSPHATE 4 MG: 4 INJECTION, SOLUTION INTRA-ARTICULAR; INTRALESIONAL; INTRAMUSCULAR; INTRAVENOUS; SOFT TISSUE at 06:13

## 2017-03-30 RX ADMIN — POTASSIUM CHLORIDE 20 MEQ: 1.5 POWDER, FOR SOLUTION ORAL at 08:23

## 2017-03-30 RX ADMIN — IPRATROPIUM BROMIDE AND ALBUTEROL SULFATE 3 ML: .5; 3 SOLUTION RESPIRATORY (INHALATION) at 04:23

## 2017-03-30 RX ADMIN — LORAZEPAM 0.5 MG: 2 INJECTION, SOLUTION INTRAMUSCULAR; INTRAVENOUS at 14:17

## 2017-03-30 RX ADMIN — IPRATROPIUM BROMIDE AND ALBUTEROL SULFATE 3 ML: .5; 3 SOLUTION RESPIRATORY (INHALATION) at 10:36

## 2017-03-30 RX ADMIN — IPRATROPIUM BROMIDE AND ALBUTEROL SULFATE 3 ML: .5; 3 SOLUTION RESPIRATORY (INHALATION) at 23:49

## 2017-03-30 RX ADMIN — LISINOPRIL 2.5 MG: 2.5 TABLET ORAL at 08:22

## 2017-03-30 RX ADMIN — IPRATROPIUM BROMIDE AND ALBUTEROL SULFATE 3 ML: .5; 3 SOLUTION RESPIRATORY (INHALATION) at 15:25

## 2017-03-30 RX ADMIN — LEVOTHYROXINE SODIUM 50 MCG: 50 TABLET ORAL at 06:14

## 2017-03-30 RX ADMIN — PANTOPRAZOLE SODIUM 40 MG: 40 TABLET, DELAYED RELEASE ORAL at 06:14

## 2017-03-30 RX ADMIN — MAGNESIUM SULFATE HEPTAHYDRATE 2 G: 40 INJECTION, SOLUTION INTRAVENOUS at 12:18

## 2017-03-30 RX ADMIN — QUETIAPINE FUMARATE 25 MG: 25 TABLET, FILM COATED ORAL at 20:20

## 2017-03-30 RX ADMIN — MAGNESIUM SULFATE HEPTAHYDRATE 2 G: 40 INJECTION, SOLUTION INTRAVENOUS at 15:47

## 2017-03-30 RX ADMIN — DEXAMETHASONE SODIUM PHOSPHATE 4 MG: 4 INJECTION, SOLUTION INTRA-ARTICULAR; INTRALESIONAL; INTRAMUSCULAR; INTRAVENOUS; SOFT TISSUE at 17:19

## 2017-03-30 RX ADMIN — MAGNESIUM SULFATE HEPTAHYDRATE 2 G: 40 INJECTION, SOLUTION INTRAVENOUS at 17:21

## 2017-03-30 RX ADMIN — DOCUSATE SODIUM 100 MG: 100 CAPSULE, LIQUID FILLED ORAL at 08:23

## 2017-03-30 RX ADMIN — CARVEDILOL 3.12 MG: 3.12 TABLET, FILM COATED ORAL at 08:23

## 2017-03-30 RX ADMIN — FLUDROCORTISONE ACETATE 100 MCG: 0.1 TABLET ORAL at 20:20

## 2017-03-30 RX ADMIN — SODIUM CHLORIDE 100 ML/HR: 9 INJECTION, SOLUTION INTRAVENOUS at 17:21

## 2017-03-30 RX ADMIN — ENOXAPARIN SODIUM 40 MG: 40 INJECTION SUBCUTANEOUS at 08:22

## 2017-03-30 RX ADMIN — OSELTAMIVIR PHOSPHATE 75 MG: 75 CAPSULE ORAL at 20:20

## 2017-03-30 RX ADMIN — POLYETHYLENE GLYCOL 3350 17 G: 17 POWDER, FOR SOLUTION ORAL at 08:23

## 2017-03-30 RX ADMIN — LORAZEPAM 0.5 MG: 2 INJECTION, SOLUTION INTRAMUSCULAR; INTRAVENOUS at 10:34

## 2017-03-30 RX ADMIN — OSELTAMIVIR PHOSPHATE 75 MG: 75 CAPSULE ORAL at 08:23

## 2017-03-30 RX ADMIN — IPRATROPIUM BROMIDE AND ALBUTEROL SULFATE 3 ML: .5; 3 SOLUTION RESPIRATORY (INHALATION) at 06:41

## 2017-03-31 LAB
ANION GAP SERPL CALCULATED.3IONS-SCNC: 4 MMOL/L (ref 3–11)
ANION GAP SERPL CALCULATED.3IONS-SCNC: 4 MMOL/L (ref 3–11)
ANION GAP SERPL CALCULATED.3IONS-SCNC: 7 MMOL/L (ref 3–11)
ANION GAP SERPL CALCULATED.3IONS-SCNC: 7 MMOL/L (ref 3–11)
ANION GAP SERPL CALCULATED.3IONS-SCNC: 8 MMOL/L (ref 3–11)
BUN BLD-MCNC: 16 MG/DL (ref 9–23)
BUN BLD-MCNC: 17 MG/DL (ref 9–23)
BUN BLD-MCNC: 18 MG/DL (ref 9–23)
BUN BLD-MCNC: 19 MG/DL (ref 9–23)
BUN BLD-MCNC: 22 MG/DL (ref 9–23)
BUN/CREAT SERPL: 22.9 (ref 7–25)
BUN/CREAT SERPL: 25.7 (ref 7–25)
BUN/CREAT SERPL: 27.5 (ref 7–25)
BUN/CREAT SERPL: 28.3 (ref 7–25)
BUN/CREAT SERPL: 31.7 (ref 7–25)
CALCIUM SPEC-SCNC: 8.9 MG/DL (ref 8.7–10.4)
CALCIUM SPEC-SCNC: 8.9 MG/DL (ref 8.7–10.4)
CALCIUM SPEC-SCNC: 9.1 MG/DL (ref 8.7–10.4)
CALCIUM SPEC-SCNC: 9.3 MG/DL (ref 8.7–10.4)
CALCIUM SPEC-SCNC: 9.7 MG/DL (ref 8.7–10.4)
CHLORIDE SERPL-SCNC: 101 MMOL/L (ref 99–109)
CHLORIDE SERPL-SCNC: 102 MMOL/L (ref 99–109)
CHLORIDE SERPL-SCNC: 105 MMOL/L (ref 99–109)
CHLORIDE SERPL-SCNC: 96 MMOL/L (ref 99–109)
CHLORIDE SERPL-SCNC: 98 MMOL/L (ref 99–109)
CO2 SERPL-SCNC: 21 MMOL/L (ref 20–31)
CO2 SERPL-SCNC: 24 MMOL/L (ref 20–31)
CO2 SERPL-SCNC: 25 MMOL/L (ref 20–31)
CREAT BLD-MCNC: 0.6 MG/DL (ref 0.6–1.3)
CREAT BLD-MCNC: 0.6 MG/DL (ref 0.6–1.3)
CREAT BLD-MCNC: 0.7 MG/DL (ref 0.6–1.3)
CREAT BLD-MCNC: 0.7 MG/DL (ref 0.6–1.3)
CREAT BLD-MCNC: 0.8 MG/DL (ref 0.6–1.3)
GFR SERPL CREATININE-BSD FRML MDRD: 107 ML/MIN/1.73
GFR SERPL CREATININE-BSD FRML MDRD: 107 ML/MIN/1.73
GFR SERPL CREATININE-BSD FRML MDRD: 128 ML/MIN/1.73
GFR SERPL CREATININE-BSD FRML MDRD: 128 ML/MIN/1.73
GFR SERPL CREATININE-BSD FRML MDRD: 92 ML/MIN/1.73
GLUCOSE BLD-MCNC: 117 MG/DL (ref 70–100)
GLUCOSE BLD-MCNC: 129 MG/DL (ref 70–100)
GLUCOSE BLD-MCNC: 146 MG/DL (ref 70–100)
GLUCOSE BLD-MCNC: 162 MG/DL (ref 70–100)
GLUCOSE BLD-MCNC: 179 MG/DL (ref 70–100)
MAGNESIUM SERPL-MCNC: 2.9 MG/DL (ref 1.3–2.7)
POTASSIUM BLD-SCNC: 4.1 MMOL/L (ref 3.5–5.5)
POTASSIUM BLD-SCNC: 4.2 MMOL/L (ref 3.5–5.5)
POTASSIUM BLD-SCNC: 4.3 MMOL/L (ref 3.5–5.5)
POTASSIUM BLD-SCNC: 4.4 MMOL/L (ref 3.5–5.5)
POTASSIUM BLD-SCNC: 5.2 MMOL/L (ref 3.5–5.5)
SODIUM BLD-SCNC: 128 MMOL/L (ref 132–146)
SODIUM BLD-SCNC: 129 MMOL/L (ref 132–146)
SODIUM BLD-SCNC: 130 MMOL/L (ref 132–146)
SODIUM BLD-SCNC: 131 MMOL/L (ref 132–146)
SODIUM BLD-SCNC: 134 MMOL/L (ref 132–146)

## 2017-03-31 PROCEDURE — 83735 ASSAY OF MAGNESIUM: CPT

## 2017-03-31 PROCEDURE — 94799 UNLISTED PULMONARY SVC/PX: CPT

## 2017-03-31 PROCEDURE — 97116 GAIT TRAINING THERAPY: CPT

## 2017-03-31 PROCEDURE — 94760 N-INVAS EAR/PLS OXIMETRY 1: CPT

## 2017-03-31 PROCEDURE — 25010000002 DEXAMETHASONE PER 1 MG: Performed by: INTERNAL MEDICINE

## 2017-03-31 PROCEDURE — 80048 BASIC METABOLIC PNL TOTAL CA: CPT | Performed by: INTERNAL MEDICINE

## 2017-03-31 PROCEDURE — 99232 SBSQ HOSP IP/OBS MODERATE 35: CPT | Performed by: INTERNAL MEDICINE

## 2017-03-31 PROCEDURE — 97110 THERAPEUTIC EXERCISES: CPT

## 2017-03-31 PROCEDURE — 94640 AIRWAY INHALATION TREATMENT: CPT

## 2017-03-31 PROCEDURE — 25010000002 ENOXAPARIN PER 10 MG

## 2017-03-31 RX ORDER — BISACODYL 5 MG/1
10 TABLET, DELAYED RELEASE ORAL DAILY PRN
Status: DISCONTINUED | OUTPATIENT
Start: 2017-03-31 | End: 2017-04-03 | Stop reason: HOSPADM

## 2017-03-31 RX ORDER — BENZONATATE 100 MG/1
100 CAPSULE ORAL 3 TIMES DAILY PRN
Status: DISCONTINUED | OUTPATIENT
Start: 2017-03-31 | End: 2017-04-03 | Stop reason: HOSPADM

## 2017-03-31 RX ORDER — GUAIFENESIN 600 MG/1
600 TABLET, EXTENDED RELEASE ORAL EVERY 12 HOURS SCHEDULED
Status: DISCONTINUED | OUTPATIENT
Start: 2017-03-31 | End: 2017-04-03 | Stop reason: HOSPADM

## 2017-03-31 RX ORDER — BISACODYL 10 MG
10 SUPPOSITORY, RECTAL RECTAL DAILY PRN
Status: DISCONTINUED | OUTPATIENT
Start: 2017-03-31 | End: 2017-04-03 | Stop reason: HOSPADM

## 2017-03-31 RX ORDER — SENNA AND DOCUSATE SODIUM 50; 8.6 MG/1; MG/1
2 TABLET, FILM COATED ORAL NIGHTLY
Status: DISCONTINUED | OUTPATIENT
Start: 2017-03-31 | End: 2017-04-03 | Stop reason: HOSPADM

## 2017-03-31 RX ADMIN — IPRATROPIUM BROMIDE AND ALBUTEROL SULFATE 3 ML: .5; 3 SOLUTION RESPIRATORY (INHALATION) at 07:09

## 2017-03-31 RX ADMIN — ACETAMINOPHEN 800 MCG: 400 TABLET ORAL at 08:40

## 2017-03-31 RX ADMIN — POTASSIUM CHLORIDE 20 MEQ: 1.5 POWDER, FOR SOLUTION ORAL at 08:40

## 2017-03-31 RX ADMIN — DEXAMETHASONE SODIUM PHOSPHATE 4 MG: 4 INJECTION, SOLUTION INTRA-ARTICULAR; INTRALESIONAL; INTRAMUSCULAR; INTRAVENOUS; SOFT TISSUE at 18:01

## 2017-03-31 RX ADMIN — PANTOPRAZOLE SODIUM 40 MG: 40 TABLET, DELAYED RELEASE ORAL at 05:09

## 2017-03-31 RX ADMIN — GUAIFENESIN 600 MG: 600 TABLET, EXTENDED RELEASE ORAL at 21:49

## 2017-03-31 RX ADMIN — DEXAMETHASONE SODIUM PHOSPHATE 4 MG: 4 INJECTION, SOLUTION INTRA-ARTICULAR; INTRALESIONAL; INTRAMUSCULAR; INTRAVENOUS; SOFT TISSUE at 05:09

## 2017-03-31 RX ADMIN — DOCUSATE SODIUM 100 MG: 100 CAPSULE, LIQUID FILLED ORAL at 08:41

## 2017-03-31 RX ADMIN — ACETAMINOPHEN 650 MG: 325 TABLET, FILM COATED ORAL at 21:49

## 2017-03-31 RX ADMIN — IPRATROPIUM BROMIDE AND ALBUTEROL SULFATE 3 ML: .5; 3 SOLUTION RESPIRATORY (INHALATION) at 15:54

## 2017-03-31 RX ADMIN — FLUDROCORTISONE ACETATE 100 MCG: 0.1 TABLET ORAL at 20:20

## 2017-03-31 RX ADMIN — LEVOTHYROXINE SODIUM 50 MCG: 50 TABLET ORAL at 05:09

## 2017-03-31 RX ADMIN — FLUDROCORTISONE ACETATE 100 MCG: 0.1 TABLET ORAL at 08:40

## 2017-03-31 RX ADMIN — Medication 2 TABLET: at 20:20

## 2017-03-31 RX ADMIN — IPRATROPIUM BROMIDE AND ALBUTEROL SULFATE 3 ML: .5; 3 SOLUTION RESPIRATORY (INHALATION) at 23:18

## 2017-03-31 RX ADMIN — LISINOPRIL 2.5 MG: 2.5 TABLET ORAL at 08:40

## 2017-03-31 RX ADMIN — SODIUM CHLORIDE 100 ML/HR: 9 INJECTION, SOLUTION INTRAVENOUS at 03:44

## 2017-03-31 RX ADMIN — ENOXAPARIN SODIUM 40 MG: 40 INJECTION SUBCUTANEOUS at 08:40

## 2017-03-31 RX ADMIN — BISACODYL 10 MG: 5 TABLET, COATED ORAL at 14:16

## 2017-03-31 RX ADMIN — QUETIAPINE FUMARATE 25 MG: 25 TABLET, FILM COATED ORAL at 20:20

## 2017-03-31 RX ADMIN — IPRATROPIUM BROMIDE AND ALBUTEROL SULFATE 3 ML: .5; 3 SOLUTION RESPIRATORY (INHALATION) at 12:16

## 2017-03-31 RX ADMIN — IPRATROPIUM BROMIDE AND ALBUTEROL SULFATE 3 ML: .5; 3 SOLUTION RESPIRATORY (INHALATION) at 03:38

## 2017-03-31 RX ADMIN — ASPIRIN 81 MG: 81 TABLET, CHEWABLE ORAL at 08:40

## 2017-03-31 RX ADMIN — POLYETHYLENE GLYCOL 3350 17 G: 17 POWDER, FOR SOLUTION ORAL at 08:40

## 2017-03-31 RX ADMIN — BENZONATATE 100 MG: 100 CAPSULE, LIQUID FILLED ORAL at 21:49

## 2017-03-31 RX ADMIN — IPRATROPIUM BROMIDE AND ALBUTEROL SULFATE 3 ML: .5; 3 SOLUTION RESPIRATORY (INHALATION) at 19:47

## 2017-04-01 LAB
ANION GAP SERPL CALCULATED.3IONS-SCNC: 2 MMOL/L (ref 3–11)
BUN BLD-MCNC: 18 MG/DL (ref 9–23)
BUN/CREAT SERPL: 30 (ref 7–25)
CALCIUM SPEC-SCNC: 8.7 MG/DL (ref 8.7–10.4)
CHLORIDE SERPL-SCNC: 106 MMOL/L (ref 99–109)
CO2 SERPL-SCNC: 26 MMOL/L (ref 20–31)
CREAT BLD-MCNC: 0.6 MG/DL (ref 0.6–1.3)
DEPRECATED RDW RBC AUTO: 49.2 FL (ref 37–54)
ERYTHROCYTE [DISTWIDTH] IN BLOOD BY AUTOMATED COUNT: 14.3 % (ref 11.3–14.5)
GFR SERPL CREATININE-BSD FRML MDRD: 128 ML/MIN/1.73
GLUCOSE BLD-MCNC: 117 MG/DL (ref 70–100)
HCT VFR BLD AUTO: 30.9 % (ref 38.9–50.9)
HGB BLD-MCNC: 10 G/DL (ref 13.1–17.5)
MCH RBC QN AUTO: 30.9 PG (ref 27–31)
MCHC RBC AUTO-ENTMCNC: 32.4 G/DL (ref 32–36)
MCV RBC AUTO: 95.4 FL (ref 80–99)
PLATELET # BLD AUTO: 357 10*3/MM3 (ref 150–450)
PMV BLD AUTO: 8.7 FL (ref 6–12)
POTASSIUM BLD-SCNC: 4.7 MMOL/L (ref 3.5–5.5)
RBC # BLD AUTO: 3.24 10*6/MM3 (ref 4.2–5.76)
SODIUM BLD-SCNC: 134 MMOL/L (ref 132–146)
WBC NRBC COR # BLD: 20.62 10*3/MM3 (ref 3.5–10.8)

## 2017-04-01 PROCEDURE — 94760 N-INVAS EAR/PLS OXIMETRY 1: CPT

## 2017-04-01 PROCEDURE — 94799 UNLISTED PULMONARY SVC/PX: CPT

## 2017-04-01 PROCEDURE — 97530 THERAPEUTIC ACTIVITIES: CPT

## 2017-04-01 PROCEDURE — 94640 AIRWAY INHALATION TREATMENT: CPT

## 2017-04-01 PROCEDURE — 99232 SBSQ HOSP IP/OBS MODERATE 35: CPT | Performed by: INTERNAL MEDICINE

## 2017-04-01 PROCEDURE — 25010000002 DEXAMETHASONE PER 1 MG: Performed by: INTERNAL MEDICINE

## 2017-04-01 PROCEDURE — 85027 COMPLETE CBC AUTOMATED: CPT | Performed by: INTERNAL MEDICINE

## 2017-04-01 PROCEDURE — 80048 BASIC METABOLIC PNL TOTAL CA: CPT | Performed by: INTERNAL MEDICINE

## 2017-04-01 PROCEDURE — 25010000002 ENOXAPARIN PER 10 MG

## 2017-04-01 RX ORDER — DEXAMETHASONE SODIUM PHOSPHATE 4 MG/ML
4 INJECTION, SOLUTION INTRA-ARTICULAR; INTRALESIONAL; INTRAMUSCULAR; INTRAVENOUS; SOFT TISSUE DAILY
Status: DISCONTINUED | OUTPATIENT
Start: 2017-04-02 | End: 2017-04-02

## 2017-04-01 RX ADMIN — POLYETHYLENE GLYCOL 3350 17 G: 17 POWDER, FOR SOLUTION ORAL at 09:58

## 2017-04-01 RX ADMIN — QUETIAPINE FUMARATE 25 MG: 25 TABLET, FILM COATED ORAL at 20:19

## 2017-04-01 RX ADMIN — ASPIRIN 81 MG: 81 TABLET, CHEWABLE ORAL at 09:59

## 2017-04-01 RX ADMIN — PANTOPRAZOLE SODIUM 40 MG: 40 TABLET, DELAYED RELEASE ORAL at 05:44

## 2017-04-01 RX ADMIN — FLUDROCORTISONE ACETATE 100 MCG: 0.1 TABLET ORAL at 20:19

## 2017-04-01 RX ADMIN — ACETAMINOPHEN 800 MCG: 400 TABLET ORAL at 09:59

## 2017-04-01 RX ADMIN — SODIUM CHLORIDE 50 ML/HR: 9 INJECTION, SOLUTION INTRAVENOUS at 09:57

## 2017-04-01 RX ADMIN — DEXAMETHASONE SODIUM PHOSPHATE 4 MG: 4 INJECTION, SOLUTION INTRA-ARTICULAR; INTRALESIONAL; INTRAMUSCULAR; INTRAVENOUS; SOFT TISSUE at 05:44

## 2017-04-01 RX ADMIN — CARVEDILOL 3.12 MG: 3.12 TABLET, FILM COATED ORAL at 09:58

## 2017-04-01 RX ADMIN — GUAIFENESIN 600 MG: 600 TABLET, EXTENDED RELEASE ORAL at 09:59

## 2017-04-01 RX ADMIN — LEVOTHYROXINE SODIUM 50 MCG: 50 TABLET ORAL at 05:44

## 2017-04-01 RX ADMIN — ENOXAPARIN SODIUM 40 MG: 40 INJECTION SUBCUTANEOUS at 09:59

## 2017-04-01 RX ADMIN — IPRATROPIUM BROMIDE AND ALBUTEROL SULFATE 3 ML: .5; 3 SOLUTION RESPIRATORY (INHALATION) at 17:04

## 2017-04-01 RX ADMIN — IPRATROPIUM BROMIDE AND ALBUTEROL SULFATE 3 ML: .5; 3 SOLUTION RESPIRATORY (INHALATION) at 07:51

## 2017-04-01 RX ADMIN — BENZONATATE 100 MG: 100 CAPSULE, LIQUID FILLED ORAL at 20:20

## 2017-04-01 RX ADMIN — GUAIFENESIN 600 MG: 600 TABLET, EXTENDED RELEASE ORAL at 20:20

## 2017-04-01 RX ADMIN — FLUDROCORTISONE ACETATE 100 MCG: 0.1 TABLET ORAL at 09:59

## 2017-04-01 RX ADMIN — IPRATROPIUM BROMIDE AND ALBUTEROL SULFATE 3 ML: .5; 3 SOLUTION RESPIRATORY (INHALATION) at 20:53

## 2017-04-01 RX ADMIN — BISACODYL 10 MG: 10 SUPPOSITORY RECTAL at 20:20

## 2017-04-01 RX ADMIN — Medication 2 TABLET: at 20:20

## 2017-04-01 RX ADMIN — IPRATROPIUM BROMIDE AND ALBUTEROL SULFATE 3 ML: .5; 3 SOLUTION RESPIRATORY (INHALATION) at 03:28

## 2017-04-01 RX ADMIN — CARVEDILOL 3.12 MG: 3.12 TABLET, FILM COATED ORAL at 17:50

## 2017-04-01 RX ADMIN — LISINOPRIL 2.5 MG: 2.5 TABLET ORAL at 09:59

## 2017-04-01 RX ADMIN — POTASSIUM CHLORIDE 20 MEQ: 1.5 POWDER, FOR SOLUTION ORAL at 09:58

## 2017-04-01 RX ADMIN — IPRATROPIUM BROMIDE AND ALBUTEROL SULFATE 3 ML: .5; 3 SOLUTION RESPIRATORY (INHALATION) at 12:22

## 2017-04-01 NOTE — PLAN OF CARE
Problem: Patient Care Overview (Adult)  Goal: Adult Individualization and Mutuality  Outcome: Ongoing (interventions implemented as appropriate)  Goal: Discharge Needs Assessment  Outcome: Ongoing (interventions implemented as appropriate)    Problem: Infection, Risk/Actual (Adult)  Goal: Infection Prevention/Resolution  Outcome: Ongoing (interventions implemented as appropriate)    Problem: Fall Risk (Adult)  Goal: Absence of Falls  Outcome: Ongoing (interventions implemented as appropriate)    Problem: Skin Integrity Impairment, Risk/Actual (Adult)  Goal: Skin Integrity/Wound Healing  Outcome: Ongoing (interventions implemented as appropriate)

## 2017-04-01 NOTE — PROGRESS NOTES
"      HOSPITALIST DAILY PROGRESS NOTE    Chief Complaint: f/u for influenza    Subjective   SUBJECTIVE/OVERNIGHT EVENTS   No acute events overnight, patient is much alert and awake oriented to person but not to place, he knows that the president is \"a rich yankee\"    Review of Systems:  Gen-no fevers, no chills  CV-no chest pain, no palpitations  Resp-no cough, no dyspnea  GI-no N/V/D, no abd pain    Objective   OBJECTIVE   I have reviewed the vital signs.  /60 (BP Location: Left arm, Patient Position: Lying)  Pulse 75  Temp 96.4 °F (35.8 °C) (Axillary)   Resp 18  Ht 70\" (177.8 cm)  Wt 155 lb (70.3 kg)  SpO2 94%  BMI 22.24 kg/m2    Physical Exam:  Gen-no acute distress  CV-RRR, S1 S2 normal, no m/r/g  Resp-CTAB, no wheezes  Abd-soft, NT, ND, +BS  Ext-no edema  Neuro-Awake and alert, Ox2, no focal deficits  Psych-appropriate very pleasant mood and affect    Results:  I have reviewed the labs      Results from last 7 days  Lab Units 04/01/17  0732 03/30/17  0507 03/28/17  0445   WBC 10*3/mm3 20.62* 13.86* 12.71*   HEMOGLOBIN g/dL 10.0* 10.8* 10.4*   HEMATOCRIT % 30.9* 32.3* 32.3*   PLATELETS 10*3/mm3 357 327 285       Results from last 7 days  Lab Units 04/01/17  0732   SODIUM mmol/L 134   POTASSIUM mmol/L 4.7   CHLORIDE mmol/L 106   TOTAL CO2 mmol/L 26.0   BUN mg/dL 18   CREATININE mg/dL 0.60   GLUCOSE mg/dL 117*   CALCIUM mg/dL 8.7     I have reviewed the medications.    Assessment/Plan   ASSESSMENT/PLAN    Principal Problem:    Acute febrile illness- Influenza B  Active Problems:    Adrenal insufficiency    Osteoarthrosis    Left humeral fracture    Hearing loss    Congestive heart failure    GERD (gastroesophageal reflux disease)    Lower extremity edema    Valvular heart disease    Mild dementia    Gait disorder    SIRS (systemic inflammatory response syndrome)    Mr. Roberson is an 85-year-old  male who lives at [facility]. [facility] with his family " with chief complaint of fever of 101.2, and associated cough and fatigue. Started 4 days prior was started on Levaquin and Ventolin outpatient without improvement. Ultimately found to have flu on admission here.       Plan:  - Acute encephalopathy improving  - adrenal insufficiency improving,  Na+ up to 134   - f/u Na+ daily, decrease decadron to qdaily today, continue fludrocortisone and IVF decrease to 50 ml/hr  - s/p empiric ABX for suspected LLL PNA, sputum culture, blood cultures NGTD  - Flu B PCR (+) -initially reported as negative. Started on Tamiflu d (4/5)  - continue nebs scheduled, oxygen PRN- currently on room air   - leukocytosis likely sec to steroids. follow  - d/c diuretics, and ACEi, BP soft  - SLP eval to r/o any dysphagia issues as pt is mildly demented; pt declined evaluation and Dtr denies pt having any swallowing concerns  - PT/OT eval -- required assist x 2 with transfers and walking today; Dtr and pt requesting IPPT now. CM aware and sent referral to [facility].   - repeat labs in AM       Dispo: I expect patient to be discharged in a few days to [facility] re[facility] likely 4/3    Dona Reis MD  04/01/17  11:26 AM

## 2017-04-01 NOTE — PROGRESS NOTES
Acute Care - Occupational Therapy Treatment Note  [facility]     Patient Name: Rio Chao Jr.  : 1932  MRN: [id number]  Today's Date: 2017  Onset of Illness/Injury or Date of Surgery Date: 17  Date of Referral to OT: 17  Referring Physician: Servando       Admit Date: 3/24/2017    Visit Dx:     ICD-10-CM ICD-9-CM   1. Generalized weakness R53.1 780.79   2. Acute febrile illness R50.9 780.60   3. Impaired mobility and ADLs Z74.09 799.89   4. Impaired functional mobility, balance, gait, and endurance Z74.09 V49.89     Patient Active Problem List   Diagnosis   • Vitamin D deficiency   • Vitamin B12 deficiency   • Venous insufficiency   • Tear of right biceps muscle   • Seasonal allergic rhinitis   • Osteoarthrosis   • Left humeral fracture   • Hypothyroidism   • Hyponatremia   • Hypogonadism male   • Hearing loss   • Congestive heart failure   • Aortic stenosis   • GERD (gastroesophageal reflux disease)   • Systolic heart failure   • Lower extremity edema   • Valvular heart disease   • Sick sinus syndrome   • Adrenal insufficiency   • Mild dementia   • Gait disorder   • Acute febrile illness- Influenza B   • SIRS (systemic inflammatory response syndrome)             Adult Rehabilitation Note       17 0934 17 0946 17 1115    Rehab Assessment/Intervention    Discipline occupational therapist  -MC physical therapist  -SR physical therapist  -SR    Document Type therapy note (daily note)  -MC therapy note (daily note)  -SR therapy note (daily note)  -SR    Subjective Information agree to therapy;complains of;weakness  -MC agree to therapy;complains of;fatigue;weakness  -SR agree to therapy;complains of;weakness;fatigue  -SR    Patient Effort, Rehab Treatment good  -MC adequate  -SR good  -SR    Symptoms Noted During/After Treatment fatigue  -MC fatigue  -SR fatigue  -SR    Precautions/Limitations fall precautions;other (see comments)   Exit alarm, droplet precautions   - fall precautions  -SR fall precautions;other (see comments)   droplet precautions  -SR    Recorded by [] Sofi Merchant, OT [SR] Joi Barnett, PT [SR] Joi Barnett, PT    Vital Signs    Pre Systolic BP Rehab --   VSS throughout  - --   vitals are WNL on RA  -SR --   vitals are WNL on RA  -SR    Recorded by [] Sofi Merchant, OT [SR] Joi Barnett, PT [SR] Joi Barnett, PT    Pain Assessment    Pain Assessment No/denies pain  - No/denies pain  -SR No/denies pain  -SR    Recorded by [] Sofi Merchant, OT [SR] Joi Barnett, PT [SR] Joi Barnett, PT    Cognitive Assessment/Intervention    Current Cognitive/Communication Assessment impaired  - impaired  -SR functional  -SR    Orientation Status oriented to;person;situation  - oriented to;person;required verbal cueing (specifiy in comments);place;time  -SR oriented x 4  -SR    Follows Commands/Answers Questions 75% of the time;needs cueing;needs increased time;needs repetition  -MC 75% of the time;needs cueing;needs increased time;needs repetition  -SR 75% of the time;needs cueing;needs increased time;needs repetition  -SR    Personal Safety moderate impairment  - moderate impairment;unaware of cognitive deficits;unaware of consequences of deficits;unaware of functional deficits  -SR mild impairment;decreased awareness, need for assist;decreased awareness, need for safety;decreased insight to deficits  -SR    Personal Safety Interventions elopement precautions initiated;fall prevention program maintained;gait belt;muscle strengthening facilitated;nonskid shoes/slippers when out of bed;supervised activity  - elopement precautions initiated;fall prevention program maintained;gait belt;nonskid shoes/slippers when out of bed  -SR elopement precautions initiated;gait belt;fall prevention program maintained;nonskid shoes/slippers when out of bed  -SR    Recorded by [] Sofi Merchant, OT [SR] Joi Barnett, PT [SR] Joi Barnett, PT    Bed Mobility,  Assessment/Treatment    Bed Mobility, Assistive Device bed rails;head of bed elevated  -MC bed rails;draw sheet;head of bed elevated  -SR     Bed Mobility, Roll Left, [place]  maximum assist (25% patient effort);2 person assist required  -SR     Bed Mobility, Roll Right, [place]  maximum assist (25% patient effort);2 person assist required  -SR     Bed Mobility, Scoot/Bridge, [place] moderate assist (50% patient effort)  -      Bed Mob, Supine to Sit, [place] contact guard assist  -MC maximum assist (25% patient effort);2 person assist required;verbal cues required  -SR     Bed Mobility, Impairments impaired balance;strength decreased  -MC coordination impaired;impaired balance;strength decreased  -SR     Bed Mobility, Comment Cues for sequencing.  Pt required assist to scoot hips to EOB  -MC pt not able to follow cues for mechanics, he is confused and very weak  -SR Pt is sitting in recliner upon arrival.  -SR    Recorded by [MC] Sofi Merchant, OT [SR] Joi Barnett, PT [SR] Joi Barnett, PT    Transfer Assessment/Treatment    Transfers, Bed-Chair [place] moderate assist (50% patient effort);2 person assist required  -MC      Transfers, Bed-Chair-Bed, Assist Device rolling walker  -MC      Transfers, Sit-Stand [place] moderate assist (50% patient effort);2 person assist required  -MC maximum assist (25% patient effort);2 person assist required;verbal cues required  -SR moderate assist (50% patient effort);2 person assist required;verbal cues required  -SR    Transfers, Stand-Sit [place] minimum assist (75% patient effort);2 person assist required  -MC maximum assist (25% patient effort);2 person assist required;verbal cues required  -SR moderate assist (50% patient effort);2 person assist required;verbal cues required  -SR    Transfers, Sit-Stand-Sit, Assist Device rolling walker  -MC rolling walker  -SR rolling walker  -SR    Transfer, Safety Issues balance decreased during  turns;sequencing ability decreased;step length decreased;loses balance backward;weight-shifting ability decreased  -      Transfer, Impairments impaired balance;strength decreased;coordination impaired  -MC impaired balance;strength decreased;coordination impaired  -SR     Transfer, Comment Cues for hand placement, safe transfer technique.  Pt with posterior lean, required cues to correct  - severe posterior lean upon standing, requires about 3 minutes to work on balance while standing before pt able to take steps toward recliner, pt has much difficulty following directions  -SR needs cues for mechanics and hand placement  -SR    Recorded by [MC] Sofi Merchant OT [SR] Joi Barnett PT [SR] Joi Barnett PT    Gait Assessment/Treatment    Gait, [place] Level  maximum assist (25% patient effort);2 person assist required;verbal cues required  -SR minimum assist (75% patient effort);2 person assist required;verbal cues required  -SR    Gait, Assistive Device  rolling walker  -SR rolling walker  -SR    Gait, Distance (Feet)  5  -  -SR    Gait, Gait Deviations   forward flexed posture;ct decreased;step length decreased;stride length decreased;narrow base   veers to L side, very small step length  -SR    Gait, Impairments  strength decreased;impaired balance;coordination impaired  -SR     Gait, Comment  very difficult few steps over to recliner, pt resumed posterior lean upon initiation of gait and needs max A x 2 to reach recliner  -SR cues to improve mechanics throughout, followed with recliner for safety  -SR    Recorded by  [SR] Joi Barnett PT [SR] Joi Barnett PT    ADL Assessment/Intervention    Additional Documentation Self-Feeding Assessment/Training (Group)  -      Recorded by [MC] Sofi Merchant OT      Lower Body Dressing Assessment/Training    LB Dressing Assess/Train, Clothing Type donning:;slipper socks  -      LB Dressing Assess/Train, Position supine  -      LB Dressing  Assess/Train, [place] dependent (less than 25% patient effort)  -      LB Dressing Assess/Train, Impairments strength decreased;ROM decreased;impaired balance  -      Recorded by [] Sofi Merchant OT      Self-Feeding Assessment/Training    Self-Feeding Assess/Train, Position supported sitting  -      Self-Feeding Assess/Train, [place] set up required  -      Self-Feeding Assess/Train, Spillage Amount minimal  -      Recorded by [] Sofi Merchant OT      Balance Skills Training    Sitting-Level of Assistance Close supervision  -      Sitting-Balance Support Feet supported  -      Sitting-Balance Activities Trunk control activities  -      Standing-Level of Assistance Minimum assistance;x2  -  Contact guard;x2  -SR    Static Standing Balance Support assistive device  -  assistive device  -    Standing-Balance Activities   Weight Shift A-P;Weight Shift R-L  -SR    Recorded by [] Sofi Merchant OT  [SR] Joi Barnett, BAUDILIO    Therapy Exercises    Bilateral Lower Extremities AAROM:;10 reps;sitting;hip flexion;ankle pumps/circles;knee flexion  - AAROM:;10 reps;sitting;LAQ;hip flexion  - 10 reps;sitting;AAROM:;LAQ;hip flexion  -    Bilateral Upper Extremity AAROM:;10 reps;sitting;elbow flexion/extension;shoulder extension/flexion;shoulder abduction/adduction  - AAROM:;10 reps;sitting;elbow flexion/extension;shoulder extension/flexion;shoulder abduction/adduction   very poor B shoulder ROM  -SR     Recorded by [] Sofi Merchant OT [SR] Joi Barnett, PT [SR] Joi Barnett, PT    Positioning and Restraints    Pre-Treatment Position in bed  - in bed  - sitting in chair/recliner  -    Post Treatment Position chair  - chair  - chair  -SR    In Chair notified nsg;reclined;call light within reach;encouraged to call for assist;exit alarm on;waffle cushion;legs elevated  - notified nsg;reclined;sitting;call light within reach;encouraged to call for assist;exit alarm on;with  family/caregiver;waffle cushion  -SR notified nsg;reclined;sitting;call light within reach;encouraged to call for assist;exit alarm on;with family/caregiver  -SR    Recorded by [MC] Sofi DAVID Merchant, OT [SR] Joi Barnett, PT [SR] Joi Barnett, PT      User Key  (r) = Recorded By, (t) = Taken By, (c) = Cosigned By    Initials Name Effective Dates    SR Joi Barnett, PT 06/19/15 -      Sofi DAVID Mayur, OT 03/14/16 -                 OT Goals       04/01/17 0959 03/27/17 1412 03/26/17 1034    Bed Mobility OT LTG    Bed Mobility OT LTG, Date Established   03/26/17  -EL    Bed Mobility OT LTG, Time to Achieve   by discharge  -EL    Bed Mobility OT LTG, Activity Type   supine to sit/sit to supine  -EL    Bed Mobility OT LTG, [place] Level   minimum assist (75% patient effort);2 person assist required  -EL    Bed Mobility OT LTG, Outcome goal partially met   met supine to sit  - goal ongoing   mod x 2  -SANDRA     Transfer Training OT LTG    Transfer Training OT LTG, Date Established   03/26/17  -EL    Transfer Training OT LTG, Time to Achieve   by discharge  -EL    Transfer Training OT LTG, Activity Type   sit to stand/stand to sit;toilet  -EL    Transfer Training OT LTG, [place] Level   contact guard assist;verbal cues required  -EL    Transfer Training OT LTG, Assist Device   walker, rolling  -EL    Transfer Training OT LTG, Outcome  goal ongoing  -SANDRA     Dynamic Standing Balance OT LTG    Dynamic Standing Balance OT LTG, Date Established   03/26/17  -EL    Dynamic Standing Balance OT LTG, Time to Achieve   by discharge  -EL    Dynamic Standing Balance OT LTG, [place] Level   contact guard assist  -EL    Dynamic Standing Balance OT LTG, Assist Device   --   with AD/ UE support PRN   -EL    Dynamic Standing Balance OT LTG, Outcome  goal ongoing   still needs intermittent min assist  -SANDRA     Patient Education OT LTG    Patient Education OT LTG, Date Established  03/26/17  -SANDRA 03/26/17  -EL    Patient Education  OT LTG, Time to Achieve   by discharge  -    Patient Education OT LTG, Education Type   HEP;home safety;energy conservation;work simplification  -    Patient Education OT LTG, Education Understanding   demonstrates adequately;verbalizes understanding  -    Patient Education OT LTG Outcome  goal ongoing   progressed with HEP, pacing self.  -SANDRA       User Key  (r) = Recorded By, (t) = Taken By, (c) = Cosigned By    Initials Name Provider Type    SANDRA Sabra Fuller, OT Occupational Therapist    DARRYL Merchant, OT Occupational Therapist    ASHLEE Knutson, OT Occupational Therapist          Occupational Therapy Education     Title: PT OT SLP Therapies (Active)     Topic: Occupational Therapy (Active)     Point: ADL training (Active)    Description: Instruct learner(s) on proper safety adaptation and remediation techniques during self care or transfers.   Instruct in proper use of assistive devices.    Learning Progress Summary    Learner Readiness Method Response Comment Documented by Status   Patient Acceptance E,D NR   04/01/17 0959 Active    Acceptance E,D VU,NL,NR bed mobilty, benefits activity, UE ther exer., posture SANDRA 03/27/17 1412 Done    Acceptance E VU,NR Educated on role of OT, benefits of activity, and safety for transfers.  03/26/17 1033 Done   Caregiver Acceptance E VU  MS 03/30/17 1803 Done               Point: Body mechanics (Active)    Description: Instruct learner(s) on proper positioning and spine alignment during self-care, functional mobility activities and/or exercises.    Learning Progress Summary    Learner Readiness Method Response Comment Documented by Status   Patient Acceptance E,D NR   04/01/17 0959 Active    Acceptance E,D VU,NL,NR bed mobilty, benefits activity, UE ther exer., posture SANDRA 03/27/17 1412 Done    Acceptance E VU,NR Educated on role of OT, benefits of activity, and safety for transfers.  03/26/17 1033 Done   Caregiver Acceptance E VU  MS 03/30/17 1803 Done                       User Key     Initials Effective Dates Name Provider Type Discipline    SANDRA 06/22/15 -  Sabra Fuller, OT Occupational Therapist OT    MS 06/16/16 -  Lisa Vargas, RN Registered Nurse Nurse    MC 03/14/16 -  Sofi Merchant, OT Occupational Therapist OT    EL 06/08/16 -  Laura Knutson, OT Occupational Therapist OT                  OT Recommendation and Plan  Anticipated Equipment Needs At Discharge:  (TBA further )  Anticipated Discharge Disposition: skilled nursing facility (rehab at [facility] prior to D/C to assisted living apartment )  Planned Therapy Interventions: activity intolerance, ADL retraining, balance training, bed mobility training, home exercise program, strengthening, transfer training  Therapy Frequency: daily  Plan of Care Review  Plan Of Care Reviewed With: patient  Progress: improving  Outcome Summary/Follow up Plan: Pt demonstrated improved strength this date, partially meeting bed mobility goal by progressing to CGA for supine to sit.  Pt with posterior lean during transfers, improved with cueing. Cont OT POC         Outcome Measures       04/01/17 0934 03/31/17 0946 03/29/17 1115    How much help from another person do you currently need...    Turning from your back to your side while in flat bed without using bedrails?  2  -SR 2  -SR    Moving from lying on back to sitting on the side of a flat bed without bedrails?  2  -SR 2  -SR    Moving to and from a bed to a chair (including a wheelchair)?  2  -SR 2  -SR    Standing up from a chair using your arms (e.g., wheelchair, bedside chair)?  2  -SR 2  -SR    Climbing 3-5 steps with a railing?  1  -SR 1  -SR    To walk in hospital room?  2  -SR 3  -SR    AM-PAC 6 Clicks Score  11  -SR 12  -SR    How much help from another is currently needed...    Putting on and taking off regular lower body clothing? 2  -MC      Bathing (including washing, rinsing, and drying) 2  -MC      Toileting (which includes using toilet bed pan or urinal) 1   -      Putting on and taking off regular upper body clothing 2  -      Taking care of personal grooming (such as brushing teeth) 2  -      Eating meals 3  -      Score 12  -      Functional Assessment    Outcome Measure Options AM-PAC 6 Clicks Daily Activity (OT)  -MC AM-PAC 6 Clicks Basic Mobility (PT)  -SR AM-PAC 6 Clicks Basic Mobility (PT)  -SR      User Key  (r) = Recorded By, (t) = Taken By, (c) = Cosigned By    Initials Name Provider Type    SR Joi Barnett, PT Physical Therapist     Sofi Merchant OT Occupational Therapist           Time Calculation:         Time Calculation- OT       04/01/17 1001          Time Calculation-     OT Start Time 0934  -      Total Timed Code Minutes- OT 18 minute(s)  -      OT Received On 04/01/17  -      OT Goal Re-Cert Due Date 04/05/17  -        User Key  (r) = Recorded By, (t) = Taken By, (c) = Cosigned By    Initials Name Provider Type     Sofi Merchant OT Occupational Therapist           Therapy Charges for Today     Code Description Service Date Service Provider Modifiers Qty    [id number]  OT THER SUPP EA 15 MIN 4/1/2017 Sofi Merchant OT GO 1    [id number]  OT THERAPEUTIC ACT EA 15 MIN 4/1/2017 Sofi Merchant OT GO 1               Sofi Merchant OT  4/1/2017

## 2017-04-01 NOTE — PLAN OF CARE
Problem: Patient Care Overview (Adult)  Goal: Plan of Care Review  Outcome: Outcome(s) achieved Date Met:  04/01/17 04/01/17 0959   Coping/Psychosocial Response Interventions   Plan Of Care Reviewed With patient   Patient Care Overview   Progress improving   Outcome Evaluation   Outcome Summary/Follow up Plan Pt demonstrated improved strength this date, partially meeting bed mobility goal by progressing to CGA for supine to sit. Pt with posterior lean during transfers, improved with cueing. Cont OT POC          Problem: Inpatient Occupational Therapy  Goal: Bed Mobility Goal LTG- OT  Outcome: Ongoing (interventions implemented as appropriate)    03/26/17 1034 04/01/17 0959   Bed Mobility OT LTG   Bed Mobility OT LTG, Date Established 03/26/17 --    Bed Mobility OT LTG, Time to Achieve by discharge --    Bed Mobility OT LTG, Activity Type supine to sit/sit to supine --    Bed Mobility OT LTG, [place] Level minimum assist (75% patient effort);2 person assist required --    Bed Mobility OT LTG, Outcome --  goal partially met  (met supine to sit)       Goal: Transfer Training Goal 1 LTG- OT  Outcome: Ongoing (interventions implemented as appropriate)    03/26/17 1034 03/27/17 1412   Transfer Training OT LTG   Transfer Training OT LTG, Date Established 03/26/17 --    Transfer Training OT LTG, Time to Achieve by discharge --    Transfer Training OT LTG, Activity Type sit to stand/stand to sit;toilet --    Transfer Training OT LTG, [place] Level contact guard assist;verbal cues required --    Transfer Training OT LTG, Assist Device walker, rolling --    Transfer Training OT LTG, Outcome --  goal ongoing       Goal: Dynamic Standing Balance Goal LTG-OT  Outcome: Ongoing (interventions implemented as appropriate)    03/26/17 1034 03/27/17 1412   Dynamic Standing Balance OT LTG   Dynamic Standing Balance OT LTG, Date Established 03/26/17 --    Dynamic Standing Balance OT LTG, Time to Achieve by discharge --     Dynamic Standing Balance OT LTG, [place] Level contact guard assist --    Dynamic Standing Balance OT LTG, Assist Device (with AD/ UE support PRN ) --    Dynamic Standing Balance OT LTG, Outcome --  goal ongoing  (still needs intermittent min assist)       Goal: Patient Education Goal LTG- OT  Outcome: Ongoing (interventions implemented as appropriate)    03/26/17 1034 03/27/17 1412   Patient Education OT LTG   Patient Education OT LTG, Date Established --  03/26/17   Patient Education OT LTG, Time to Achieve by discharge --    Patient Education OT LTG, Education Type HEP;home safety;energy conservation;work simplification --    Patient Education OT LTG, Education Understanding demonstrates adequately;verbalizes understanding --    Patient Education OT LTG Outcome --  goal ongoing  (progressed with HEP, pacing self.)

## 2017-04-02 LAB
ANION GAP SERPL CALCULATED.3IONS-SCNC: 6 MMOL/L (ref 3–11)
BUN BLD-MCNC: 23 MG/DL (ref 9–23)
BUN/CREAT SERPL: 32.9 (ref 7–25)
CALCIUM SPEC-SCNC: 8.9 MG/DL (ref 8.7–10.4)
CHLORIDE SERPL-SCNC: 107 MMOL/L (ref 99–109)
CO2 SERPL-SCNC: 25 MMOL/L (ref 20–31)
CREAT BLD-MCNC: 0.7 MG/DL (ref 0.6–1.3)
DEPRECATED RDW RBC AUTO: 52.4 FL (ref 37–54)
ERYTHROCYTE [DISTWIDTH] IN BLOOD BY AUTOMATED COUNT: 14.8 % (ref 11.3–14.5)
GFR SERPL CREATININE-BSD FRML MDRD: 107 ML/MIN/1.73
GLUCOSE BLD-MCNC: 74 MG/DL (ref 70–100)
HCT VFR BLD AUTO: 30.1 % (ref 38.9–50.9)
HGB BLD-MCNC: 9.3 G/DL (ref 13.1–17.5)
MCH RBC QN AUTO: 30.1 PG (ref 27–31)
MCHC RBC AUTO-ENTMCNC: 30.9 G/DL (ref 32–36)
MCV RBC AUTO: 97.4 FL (ref 80–99)
PLATELET # BLD AUTO: 384 10*3/MM3 (ref 150–450)
PMV BLD AUTO: 8.8 FL (ref 6–12)
POTASSIUM BLD-SCNC: 4.5 MMOL/L (ref 3.5–5.5)
RBC # BLD AUTO: 3.09 10*6/MM3 (ref 4.2–5.76)
SODIUM BLD-SCNC: 138 MMOL/L (ref 132–146)
WBC NRBC COR # BLD: 16.71 10*3/MM3 (ref 3.5–10.8)

## 2017-04-02 PROCEDURE — 85027 COMPLETE CBC AUTOMATED: CPT | Performed by: INTERNAL MEDICINE

## 2017-04-02 PROCEDURE — 25010000002 ENOXAPARIN PER 10 MG

## 2017-04-02 PROCEDURE — 25010000002 DEXAMETHASONE PER 1 MG: Performed by: INTERNAL MEDICINE

## 2017-04-02 PROCEDURE — 99232 SBSQ HOSP IP/OBS MODERATE 35: CPT | Performed by: HOSPITALIST

## 2017-04-02 PROCEDURE — 94799 UNLISTED PULMONARY SVC/PX: CPT

## 2017-04-02 PROCEDURE — 94760 N-INVAS EAR/PLS OXIMETRY 1: CPT

## 2017-04-02 PROCEDURE — 80048 BASIC METABOLIC PNL TOTAL CA: CPT | Performed by: INTERNAL MEDICINE

## 2017-04-02 PROCEDURE — 94640 AIRWAY INHALATION TREATMENT: CPT

## 2017-04-02 RX ADMIN — IPRATROPIUM BROMIDE AND ALBUTEROL SULFATE 3 ML: .5; 3 SOLUTION RESPIRATORY (INHALATION) at 06:55

## 2017-04-02 RX ADMIN — IPRATROPIUM BROMIDE AND ALBUTEROL SULFATE 3 ML: .5; 3 SOLUTION RESPIRATORY (INHALATION) at 15:34

## 2017-04-02 RX ADMIN — ACETAMINOPHEN 800 MCG: 400 TABLET ORAL at 09:59

## 2017-04-02 RX ADMIN — PANTOPRAZOLE SODIUM 40 MG: 40 TABLET, DELAYED RELEASE ORAL at 05:46

## 2017-04-02 RX ADMIN — FLUDROCORTISONE ACETATE 100 MCG: 0.1 TABLET ORAL at 22:03

## 2017-04-02 RX ADMIN — GUAIFENESIN 600 MG: 600 TABLET, EXTENDED RELEASE ORAL at 22:03

## 2017-04-02 RX ADMIN — IPRATROPIUM BROMIDE AND ALBUTEROL SULFATE 3 ML: .5; 3 SOLUTION RESPIRATORY (INHALATION) at 00:53

## 2017-04-02 RX ADMIN — Medication 2 TABLET: at 22:03

## 2017-04-02 RX ADMIN — IPRATROPIUM BROMIDE AND ALBUTEROL SULFATE 3 ML: .5; 3 SOLUTION RESPIRATORY (INHALATION) at 03:45

## 2017-04-02 RX ADMIN — POTASSIUM CHLORIDE 20 MEQ: 1.5 POWDER, FOR SOLUTION ORAL at 09:58

## 2017-04-02 RX ADMIN — GUAIFENESIN 600 MG: 600 TABLET, EXTENDED RELEASE ORAL at 09:58

## 2017-04-02 RX ADMIN — LEVOTHYROXINE SODIUM 50 MCG: 50 TABLET ORAL at 05:45

## 2017-04-02 RX ADMIN — ENOXAPARIN SODIUM 40 MG: 40 INJECTION SUBCUTANEOUS at 09:58

## 2017-04-02 RX ADMIN — POLYETHYLENE GLYCOL 3350 17 G: 17 POWDER, FOR SOLUTION ORAL at 09:58

## 2017-04-02 RX ADMIN — LISINOPRIL 2.5 MG: 2.5 TABLET ORAL at 09:58

## 2017-04-02 RX ADMIN — IPRATROPIUM BROMIDE AND ALBUTEROL SULFATE 3 ML: .5; 3 SOLUTION RESPIRATORY (INHALATION) at 23:59

## 2017-04-02 RX ADMIN — ASPIRIN 81 MG: 81 TABLET, CHEWABLE ORAL at 09:58

## 2017-04-02 RX ADMIN — FLUDROCORTISONE ACETATE 100 MCG: 0.1 TABLET ORAL at 09:58

## 2017-04-02 RX ADMIN — DEXAMETHASONE SODIUM PHOSPHATE 4 MG: 4 INJECTION, SOLUTION INTRA-ARTICULAR; INTRALESIONAL; INTRAMUSCULAR; INTRAVENOUS; SOFT TISSUE at 09:58

## 2017-04-02 RX ADMIN — IPRATROPIUM BROMIDE AND ALBUTEROL SULFATE 3 ML: .5; 3 SOLUTION RESPIRATORY (INHALATION) at 20:08

## 2017-04-02 RX ADMIN — CARVEDILOL 3.12 MG: 3.12 TABLET, FILM COATED ORAL at 09:58

## 2017-04-02 RX ADMIN — QUETIAPINE FUMARATE 25 MG: 25 TABLET, FILM COATED ORAL at 22:03

## 2017-04-02 RX ADMIN — CARVEDILOL 3.12 MG: 3.12 TABLET, FILM COATED ORAL at 18:25

## 2017-04-02 NOTE — PLAN OF CARE
Problem: Infection, Risk/Actual (Adult)  Goal: Infection Prevention/Resolution  Outcome: Ongoing (interventions implemented as appropriate)    04/02/17 0346   Infection, Risk/Actual (Adult)   Infection Prevention/Resolution making progress toward outcome         Problem: Fall Risk (Adult)  Goal: Absence of Falls  Outcome: Ongoing (interventions implemented as appropriate)    04/02/17 0346   Fall Risk (Adult)   Absence of Falls making progress toward outcome         Problem: Skin Integrity Impairment, Risk/Actual (Adult)  Goal: Skin Integrity/Wound Healing  Outcome: Ongoing (interventions implemented as appropriate)    04/02/17 0346   Skin Integrity Impairment, Risk/Actual (Adult)   Skin Integrity/Wound Healing making progress toward outcome

## 2017-04-02 NOTE — PROGRESS NOTES
[facility] Medicine Services  INPATIENT PROGRESS NOTE    Date of Admission: 3/24/2017  Length of Stay: 8  Primary Care Physician: Tim Doherty MD    Subjective-- HPI/Events overnight/ROS/CC- Hospital follow visit.  Detailed ROS not detailed as performed below      Doind well and denies any complaints, asking about discharge.      Objective      Temp:  [97.4 °F (36.3 °C)-98.1 °F (36.7 °C)] 98 °F (36.7 °C)  Heart Rate:  [75-86] 80  Resp:  [15-20] 20  BP: (114-146)/(52-71) 131/57    Physical Exam:  Physical Exam    General Assessment: No acute cardiopulmonary distress. Well developed and well nourished.    CVS: RRR, S1S2 normal    Resp: CTAB, no adventitious sound    Abd: soft, NT, ND, normal BS, no guarding or peritoneal signs    Ext: 1+ edema    Neuro: Nonfocal    Skin: W/D/I. No rash.    Psych: Affect is appropriate      Results Review:    I have reviewed the labs, radiology results and diagnostic studies.      Results from last 7 days  Lab Units 04/02/17  0652   WBC 10*3/mm3 16.71*   HEMOGLOBIN g/dL 9.3*   PLATELETS 10*3/mm3 384       Results from last 7 days  Lab Units 04/02/17  0652   SODIUM mmol/L 138   POTASSIUM mmol/L 4.5   TOTAL CO2 mmol/L 25.0   CREATININE mg/dL 0.70   GLUCOSE mg/dL 74       Culture Data:  Radiology Data:   None      I have reviewed the medications.        Assessment/Plan     Assessment/Problem List  This is an 86 y/o M with history of adrenal insufficiency, CHF, and mild demential, a resident of [place], brought in for dyspnea. Pt had been on Levaquin x 4 day imperically, but later found to have influenza B illness.     Problem:    SIRS (systemic inflammatory response syndrome)/Acute febrile illness- Influenza B   - improving   - outside therapeutic window for Tamiflu   - CXR neg for infiltrates, abx discontinued      Adrenal insufficiency   - hyponatremia on admission (resolved now), but could be due to infection vs vol contraction also   - on Decadron IV and  Fludrocortisone   - will DC Decadron    Chronic and stable:    Osteoarthrosis    Congestive heart failure    GERD (gastroesophageal reflux disease)    Valvular heart disease    Mild dementia    Gait disorder     DVT prophylaxis:Lovenox  Discharge Planning:Back to [place] tomorrow.    Jen Scott MD   04/02/17   7:43 PM    Please note that portions of this note may have been completed with a voice recognition program. Efforts were made to edit the dictations, but occasionally words are mistranscribed.

## 2017-04-02 NOTE — PLAN OF CARE
Problem: Patient Care Overview (Adult)  Goal: Adult Individualization and Mutuality  Outcome: Ongoing (interventions implemented as appropriate)    Problem: Infection, Risk/Actual (Adult)  Goal: Infection Prevention/Resolution  Outcome: Ongoing (interventions implemented as appropriate)    04/02/17 1621   Infection, Risk/Actual (Adult)   Infection Prevention/Resolution making progress toward outcome         Problem: Fall Risk (Adult)  Goal: Absence of Falls  Outcome: Ongoing (interventions implemented as appropriate)    04/02/17 1621   Fall Risk (Adult)   Absence of Falls making progress toward outcome         Problem: Skin Integrity Impairment, Risk/Actual (Adult)  Goal: Skin Integrity/Wound Healing  Outcome: Ongoing (interventions implemented as appropriate)    04/02/17 1621   Skin Integrity Impairment, Risk/Actual (Adult)   Skin Integrity/Wound Healing making progress toward outcome

## 2017-04-03 VITALS
RESPIRATION RATE: 16 BRPM | HEART RATE: 79 BPM | WEIGHT: 155 LBS | SYSTOLIC BLOOD PRESSURE: 121 MMHG | BODY MASS INDEX: 22.19 KG/M2 | HEIGHT: 70 IN | DIASTOLIC BLOOD PRESSURE: 62 MMHG | OXYGEN SATURATION: 99 % | TEMPERATURE: 97.9 F

## 2017-04-03 PROBLEM — R50.9 ACUTE FEBRILE ILLNESS: Status: RESOLVED | Noted: 2017-03-25 | Resolved: 2017-04-03

## 2017-04-03 PROBLEM — R65.10 SIRS (SYSTEMIC INFLAMMATORY RESPONSE SYNDROME) (HCC): Status: RESOLVED | Noted: 2017-03-25 | Resolved: 2017-04-03

## 2017-04-03 LAB
ANION GAP SERPL CALCULATED.3IONS-SCNC: 5 MMOL/L (ref 3–11)
BUN BLD-MCNC: 20 MG/DL (ref 9–23)
BUN/CREAT SERPL: 33.3 (ref 7–25)
CALCIUM SPEC-SCNC: 9 MG/DL (ref 8.7–10.4)
CHLORIDE SERPL-SCNC: 107 MMOL/L (ref 99–109)
CO2 SERPL-SCNC: 30 MMOL/L (ref 20–31)
CREAT BLD-MCNC: 0.6 MG/DL (ref 0.6–1.3)
GFR SERPL CREATININE-BSD FRML MDRD: 128 ML/MIN/1.73
GLUCOSE BLD-MCNC: 73 MG/DL (ref 70–100)
POTASSIUM BLD-SCNC: 4.2 MMOL/L (ref 3.5–5.5)
SODIUM BLD-SCNC: 142 MMOL/L (ref 132–146)

## 2017-04-03 PROCEDURE — 99239 HOSP IP/OBS DSCHRG MGMT >30: CPT | Performed by: PHYSICIAN ASSISTANT

## 2017-04-03 PROCEDURE — 25010000002 ENOXAPARIN PER 10 MG

## 2017-04-03 PROCEDURE — 80048 BASIC METABOLIC PNL TOTAL CA: CPT | Performed by: HOSPITALIST

## 2017-04-03 PROCEDURE — 94799 UNLISTED PULMONARY SVC/PX: CPT

## 2017-04-03 PROCEDURE — 94760 N-INVAS EAR/PLS OXIMETRY 1: CPT

## 2017-04-03 RX ORDER — LISINOPRIL 5 MG/1
2.5 TABLET ORAL DAILY
Start: 2017-04-03 | End: 2017-05-15 | Stop reason: SDUPTHER

## 2017-04-03 RX ORDER — BUMETANIDE 0.5 MG/1
0.5 TABLET ORAL DAILY
Qty: 90 TABLET | Refills: 1
Start: 2017-04-03 | End: 2017-05-15 | Stop reason: SDUPTHER

## 2017-04-03 RX ORDER — ACETAMINOPHEN 325 MG/1
650 TABLET ORAL EVERY 6 HOURS PRN
Refills: 0
Start: 2017-04-03

## 2017-04-03 RX ORDER — FLUDROCORTISONE ACETATE 0.1 MG/1
0.1 TABLET ORAL 2 TIMES DAILY
Qty: 90 TABLET | Refills: 0
Start: 2017-04-03 | End: 2017-05-15 | Stop reason: SDUPTHER

## 2017-04-03 RX ORDER — IPRATROPIUM BROMIDE AND ALBUTEROL SULFATE 2.5; .5 MG/3ML; MG/3ML
3 SOLUTION RESPIRATORY (INHALATION) EVERY 4 HOURS PRN
Status: DISCONTINUED | OUTPATIENT
Start: 2017-04-03 | End: 2017-04-03 | Stop reason: HOSPADM

## 2017-04-03 RX ADMIN — FLUDROCORTISONE ACETATE 100 MCG: 0.1 TABLET ORAL at 08:34

## 2017-04-03 RX ADMIN — ASPIRIN 81 MG: 81 TABLET, CHEWABLE ORAL at 08:33

## 2017-04-03 RX ADMIN — POTASSIUM CHLORIDE 20 MEQ: 1.5 POWDER, FOR SOLUTION ORAL at 08:42

## 2017-04-03 RX ADMIN — LEVOTHYROXINE SODIUM 50 MCG: 50 TABLET ORAL at 06:43

## 2017-04-03 RX ADMIN — GUAIFENESIN 600 MG: 600 TABLET, EXTENDED RELEASE ORAL at 08:32

## 2017-04-03 RX ADMIN — SODIUM CHLORIDE 50 ML/HR: 9 INJECTION, SOLUTION INTRAVENOUS at 01:47

## 2017-04-03 RX ADMIN — ENOXAPARIN SODIUM 40 MG: 40 INJECTION SUBCUTANEOUS at 08:32

## 2017-04-03 RX ADMIN — IPRATROPIUM BROMIDE AND ALBUTEROL SULFATE 3 ML: .5; 3 SOLUTION RESPIRATORY (INHALATION) at 03:24

## 2017-04-03 RX ADMIN — ACETAMINOPHEN 800 MCG: 400 TABLET ORAL at 08:32

## 2017-04-03 RX ADMIN — CARVEDILOL 3.12 MG: 3.12 TABLET, FILM COATED ORAL at 08:33

## 2017-04-03 RX ADMIN — LISINOPRIL 2.5 MG: 2.5 TABLET ORAL at 08:33

## 2017-04-03 RX ADMIN — POLYETHYLENE GLYCOL 3350 17 G: 17 POWDER, FOR SOLUTION ORAL at 08:32

## 2017-04-03 RX ADMIN — PANTOPRAZOLE SODIUM 40 MG: 40 TABLET, DELAYED RELEASE ORAL at 06:43

## 2017-04-03 NOTE — PLAN OF CARE
Problem: Infection, Risk/Actual (Adult)  Goal: Infection Prevention/Resolution  Outcome: Ongoing (interventions implemented as appropriate)    04/03/17 0503   Infection, Risk/Actual (Adult)   Infection Prevention/Resolution making progress toward outcome         04/03/17 0503   Infection, Risk/Actual (Adult)   Infection Prevention/Resolution making progress toward outcome         Problem: Fall Risk (Adult)  Goal: Absence of Falls  Outcome: Ongoing (interventions implemented as appropriate)    04/03/17 0503   Fall Risk (Adult)   Absence of Falls making progress toward outcome         Problem: Skin Integrity Impairment, Risk/Actual (Adult)  Goal: Skin Integrity/Wound Healing  Outcome: Ongoing (interventions implemented as appropriate)    04/03/17 0503   Skin Integrity Impairment, Risk/Actual (Adult)   Skin Integrity/Wound Healing making progress toward outcome

## 2017-04-03 NOTE — DISCHARGE PLACEMENT REQUEST
"China Chao Jr. (85 y.o. Male)     Date of Birth Social Security Number Address Home Phone MRN    02/22/1932  [id number]   [facility] [place] [address] [phone] [id number]    [employer] Mar[employer] Status          None        Admission Date Admission Type Admitting Provider Attending Provider Department, Room/Bed    3/24/17 Emergency Jen Scott MD Kham[place]hong, Phonekeo, MD [facility] 6B, N638/1    Discharge Date Discharge Disposition Discharge Destination         Rehab Facility or Unit (DC - External)             Attending Provider: Jen Scott MD     Allergies:  Penicillins    Isolation:  None   Infection:  None   Code Status:  FULL    Ht:  70\" (177.8 cm)   Wt:  155 lb (70.3 kg)    Admission Cmt:  None   Principal Problem:  Acute febrile illness- Influenza B [R50.9]                 Active Insurance as of 3/24/2017     Primary Coverage     Payor Plan Insurance Group Employer/Plan Group    HUMANA MEDICARE REPLACEMENT HUMANA MEDICARE REPL [id number]     Payor Plan Address Payor Plan Phone Number Effective From Effective To    [address] [address] [phone] 1/1/2015     [place] [address]       Subscriber Name Subscriber Birth Date Member ID       CHINA CHAO JR. 2/22/1932 [id number]                 Emergency Contacts      (Rel.) Home Phone Work Phone Mobile Phone    Caryn Brannon (Daughter) [phone] -- [phone]               Discharge Summary      CAM Cotton at 4/3/2017 10:25 AM              [facility] Medicine Services  DISCHARGE SUMMARY       Date of Admission: 3/24/2017  Date of Discharge:  4/3/2017  Primary Care Physician: Tim Doherty MD    Discharge Diagnoses:  Active Hospital Problems (** Indicates Principal Problem)    Diagnosis Date Noted   • Gait disorder [R26.9] 03/25/2017   • Valvular heart disease [I38]    • Mild dementia [F03.90]    • Lower extremity edema [R60.0]    • Adrenal " insufficiency [E27.40]    • Osteoarthrosis [M19.90]    • Hearing loss [H91.90]    • Congestive heart failure [I50.9]    • GERD (gastroesophageal reflux disease) [K21.9]    • Left humeral fracture [S42.302A] 12/01/2015      Resolved Hospital Problems    Diagnosis Date Noted Date Resolved   • **Acute febrile illness- Influenza B [R50.9] 03/25/2017 04/03/2017   • SIRS (systemic inflammatory response syndrome) [R65.10] 03/25/2017 04/03/2017   • Hyponatremia [E87.1]  04/03/2017     Presenting Problem/History of Present Illness  SIRS (systemic inflammatory response syndrome) [R65.10]     History of Present Illness on Day of Discharge:   No complaints today. Breathing much improved, no longer needing O2. Cough improving, still non-productive. No chest pain, SOA, N/V/D or constipation. Wants to leave today.     Hospital Course  Mr. Chao is an 86yo male resident of [facility] living [facility] with a history of CHF, mild dementia, hypothyroidism, valvular heart disease, sick sinus syndrome, unsteady gait, GERD, chronic lower extremity edema and adrenal insufficiency. He presented to [facility] 3/25/17 with complaints of generalized weakness x 4 days. Family noted cough productive of clear sputum as well. He was seen by his PCP, Dr. Doherty, two days prior to presentation and started on Levofloxacin and Ventolin inhaler.     ED evaluation revealed temperature of 101.4, white blood cell count of 18.76.  Chest x-ray suggestive of possible left pneumonia and he was started on empiric Rocephin and azithromycin IV.  Influenza PCR's were initially reported as negative but  on 3/26 a corrected result was released and reported the patient influenza be positive. Empiric antibiotics were stopped once influenza was confirmed and he was started on Tamiflu.  Speech therapy was consulted to assess the patient however he declined evaluation and patient's daughter denied concerns with swallowing.  Mr. Chao was able to  wean off of oxygen during this hospitalization. Cough has improved and continues to be nonproductive.    Patient was noted to have worsening abdominal insufficiency with hyponatremia during this hospitalization.  He was given Decadron fludrocortisone and IV fluids with improvement in his sodium.  Sodium on day of discharge 142.  Recommend repeat BNP in 1 week.      PT/OT consulted to assess the patient.  They did recommend inpatient rehabilitation at discharge and case management was consulted to assist with planning.  Patient was accepted at [facility] and will be transferred in stable condition on 4/3/17.    Pertinent Test Results:    Results from last 7 days  Lab Units 04/02/17  0652 04/01/17  0732 03/30/17  0507   WBC 10*3/mm3 16.71* 20.62* 13.86*   HEMOGLOBIN g/dL 9.3* 10.0* 10.8*   HEMATOCRIT % 30.1* 30.9* 32.3*   PLATELETS 10*3/mm3 384 357 327     Results from last 7 days  Lab Units 04/03/17  0801 04/02/17  0652 04/01/17  0732   SODIUM mmol/L 142 138 134   POTASSIUM mmol/L 4.2 4.5 4.7   CHLORIDE mmol/L 107 107 106   TOTAL CO2 mmol/L 30.0 25.0 26.0   BUN mg/dL 20 23 18   CREATININE mg/dL 0.60 0.70 0.60   GLUCOSE mg/dL 73 74 117*   CALCIUM mg/dL 9.0 8.9 8.7      Influenza A & B, RT PCR [[id number]] (Abnormal) Collected: 03/25/17 0030        Lab Status: Edited Result - FINAL Specimen: Swab from Nasopharynx Updated: 03/26/17 1214        Influenza A PCR Not Detected        Influenza B PCR Detected (A)         Corrected result. Previous result was Not Detected on 3/25/2017 at 0821 EDT          Blood Culture [[id number]] (Normal) Collected: 03/24/17 2219       Lab Status: Final result Specimen: Blood from Arm, Right Updated: 03/29/17 2301        Blood Culture No growth at 5 days       Blood Culture [[id number]] (Normal) Collected: 03/24/17 2213       Lab Status: Final result Specimen: Blood from Groin, right Updated: 03/30/17 0002        Blood Culture No growth at 5 days      Condition on Discharge:  Stable, improved    Physical Exam   Temp:  [97.4 °F (36.3 °C)-98.4 °F (36.9 °C)] 98.4 °F (36.9 °C)  Heart Rate:  [79-89] 89  Resp:  [16-20] 16  BP: (114-133)/(49-70) 132/56     Constitutional: no acute distress, awake, alert  Eyes: PERRLA, sclerae anicteric, no conjunctival injection  Neck: supple, no thyromegaly, trachea midline  Respiratory: Clear to auscultation bilaterally, nonlabored respirations   Cardiovascular: RRR, no murmurs, rubs, or gallops, palpable pedal pulses bilaterally  Gastrointestinal: Positive bowel sounds, soft, nontender, nondistended  Musculoskeletal: 1+ non-pitting BLE edema, no clubbing or cyanosis to bilateral lower extremities  Psychiatric: oriented x 3, appropriate affect, cooperative  Neurologic: Strength symmetric in all extremities, Cranial Nerves grossly intact to confrontation     Discharge Disposition  Rehab Facility or Unit (DC - External)    Discharge Medications   Rio Chao Jr.   Home Medication Instructions LESIA:[id number]    Printed on:04/03/17 1040   Medication Information                      acetaminophen (TYLENOL) 325 MG tablet  Take 2 tablets by mouth Every 6 (Six) Hours As Needed for Mild Pain (1-3).             aspirin 81 MG tablet  Take 1 tablet by mouth daily.             bumetanide (BUMEX) 0.5 MG tablet  Take 1 tablet by mouth Daily.             carvedilol (COREG) 3.125 MG tablet  Take 1 tablet by mouth 2 (Two) Times a Day With Meals.             cyanocobalamin 1000 MCG/ML injection  Inject 1 mL into the shoulder, thigh, or buttocks Every 28 (Twenty-Eight) Days.             Ergocalciferol (VITAMIN D2) 2000 UNITS tablet  Take 1 tablet by mouth daily.             fludrocortisone 0.1 MG tablet  Take 1 tablet by mouth 2 (Two) Times a Day.             folic acid (FOLVITE) 800 MCG tablet  Take 1 tablet by mouth daily.             levothyroxine (SYNTHROID, LEVOTHROID) 50 MCG tablet  Take 1 tablet by mouth Daily.             lisinopril (PRINIVIL,ZESTRIL) 5 MG  "tablet  Take 0.5 tablets by mouth Daily.             omeprazole (PriLOSEC) 20 MG capsule  Take 1 capsule by mouth daily.             potassium chloride ER (K-TAB) 20 MEQ tablet controlled-release ER tablet  Take 1 tablet by mouth Daily.             spironolactone (ALDACTONE) 50 MG tablet  Take 1 tablet by mouth Daily.             Syringe 25G X 5/8\" 3 ML misc  1 syringe Every 28 (Twenty-Eight) Days.               Diet Instructions     Diet: Regular; Thin Liquids, No Restrictions       Discharge Diet:  Regular   Fluid Consistency:  Thin Liquids, No Restrictions               Activity Instructions     Activity as Tolerated                   Follow-up Appointments  No future appointments.       Additional Instructions for the Follow-ups that You Need to Schedule     Discharge Follow-up with PCP    As directed    Follow Up Details:  3-5 days after d/c from rehab             Follow-up Information     Follow up with [facility] .    Specialties:  Skilled Nursing Facility, Intermediate Care Facility    Contact information:    [address] [place] [id number] [phone]        Follow up with Tim Doherty MD .    Specialties:  Family Medicine, Hospitalist    Why:  3-5 days after d/c from rehab    Contact information:    [address] [place] [address] [place] [address]  [phone]     CAM Cotton 04/03/17 10:40 AM    Time: Discharge 40 min    Please note that portions of this note may have been completed with a voice recognition program. Efforts were made to edit the dictations, but occasionally words are mistranscribed.       Electronically signed by CAM Cotton at 4/3/2017 10:49 AM        "

## 2017-04-04 NOTE — THERAPY DISCHARGE NOTE
Acute Care - Physical Therapy Discharge Summary  [facility]       Patient Name: Rio Chao Jr.  : 1932  MRN: [id number]    Today's Date: 2017  Onset of Illness/Injury or Date of Surgery Date: 17    Date of Referral to PT: 17  Referring Physician: Servando       Admit Date: 3/24/2017      PT Recommendation and Plan    Visit Dx:    ICD-10-CM ICD-9-CM   1. Generalized weakness R53.1 780.79   2. Acute febrile illness R50.9 780.60   3. Impaired mobility and ADLs Z74.09 799.89   4. Impaired functional mobility, balance, gait, and endurance Z74.09 V49.89   5. Chronic systolic congestive heart failure I50.22 428.22     428.0   6. Hyponatremia E87.1 276.1             Outcome Measures       17 0934          How much help from another is currently needed...    Putting on and taking off regular lower body clothing? 2  -MC      Bathing (including washing, rinsing, and drying) 2  -MC      Toileting (which includes using toilet bed pan or urinal) 1  -MC      Putting on and taking off regular upper body clothing 2  -MC      Taking care of personal grooming (such as brushing teeth) 2  -MC      Eating meals 3  -MC      Score 12  -MC      Functional Assessment    Outcome Measure Options AM-PAC 6 Clicks Daily Activity (OT)  -MC        User Key  (r) = Recorded By, (t) = Taken By, (c) = Cosigned By    Initials Name Provider Type    DARRYL Merchant, OT Occupational Therapist                      IP PT Goals       17 1131 17 1216 17 1408    Bed Mobility PT LTG    Bed Mobility PT LTG, Date Goal Reviewed 17  -SR 17  -SR     Bed Mobility PT LTG, Outcome   goal ongoing  -DR    Transfer Training PT LTG    Transfer Training PT  LTG, Date Goal Reviewed 17  -SR 17  -SR     Transfer Training PT LTG, Outcome   goal ongoing  -DR    Gait Training PT LTG    Gait Training Goal PT LTG, Date Goal Reviewed 17  -SR 17  -SR     Gait Training Goal PT LTG, Outcome    goal ongoing  -DR      03/26/17 1133          Bed Mobility PT LTG    Bed Mobility PT LTG, Date Established 03/26/17  -MB      Bed Mobility PT LTG, Time to Achieve 5 - 7 days  -MB      Bed Mobility PT LTG, Activity Type all bed mobility  -MB      Bed Mobility PT LTG, [place] Level independent  -MB      Bed Mobility PT LTG, Outcome goal ongoing  -MB      Transfer Training PT LTG    Transfer Training PT LTG, Date Established 03/26/17  -MB      Transfer Training PT LTG, Time to Achieve 5 - 7 days  -MB      Transfer Training PT LTG, Activity Type bed to chair /chair to bed;sit to stand/stand to sit  -MB      Transfer Training PT LTG, [place] Level independent  -MB      Transfer Training PT LTG, Assist Device walker, rolling  -MB      Transfer Training PT LTG, Outcome goal ongoing  -MB      Gait Training PT LTG    Gait Training Goal PT LTG, Date Established 03/26/17  -MB      Gait Training Goal PT LTG, Time to Achieve 5 - 7 days  -MB      Gait Training Goal PT LTG, [place] Level independent  -MB      Gait Training Goal PT LTG, Assist Device walker, rolling  -MB      Gait Training Goal PT LTG, Distance to Achieve 150  -MB      Gait Training Goal PT LTG, Outcome goal ongoing  -MB        User Key  (r) = Recorded By, (t) = Taken By, (c) = Cosigned By    Initials Name Provider Type    SR Joi Barnett, PT Physical Therapist    DOTTIE Ahumada, PT Physical Therapist    DR Devyn Calderon, PT Physical Therapist            Goals Status: Treatment plan discontinued secondary to discharge from acute facility.  PT Discharge Summary  Reason for Discharge: Discharge from facility  Outcomes Achieved: Refer to plan of care for updates on goals achieved  Discharge Destination: SNF      Laura Pleitez, PT   4/4/2017

## 2017-04-04 NOTE — THERAPY DISCHARGE NOTE
Acute Care - Occupational Therapy Discharge Summary  [facility]     Patient Name: Rio Chao Jr.  : 1932  MRN: [id number]    Today's Date: 2017  Onset of Illness/Injury or Date of Surgery Date: 17    Date of Referral to OT: 17  Referring Physician: Servando       Admit Date: 3/24/2017        OT Recommendation and Plan    Visit Dx:    ICD-10-CM ICD-9-CM   1. Generalized weakness R53.1 780.79   2. Acute febrile illness R50.9 780.60   3. Impaired mobility and ADLs Z74.09 799.89   4. Impaired functional mobility, balance, gait, and endurance Z74.09 V49.89   5. Chronic systolic congestive heart failure I50.22 428.22     428.0   6. Hyponatremia E87.1 276.1                     OT Goals       17 0959 17 1412 17 1034    Bed Mobility OT LTG    Bed Mobility OT LTG, Date Established   17  -EL    Bed Mobility OT LTG, Time to Achieve   by discharge  -EL    Bed Mobility OT LTG, Activity Type   supine to sit/sit to supine  -EL    Bed Mobility OT LTG, [place] Level   minimum assist (75% patient effort);2 person assist required  -EL    Bed Mobility OT LTG, Outcome goal partially met   met supine to sit  -MC goal ongoing   mod x 2  -SANDRA     Transfer Training OT LTG    Transfer Training OT LTG, Date Established   17  -EL    Transfer Training OT LTG, Time to Achieve   by discharge  -EL    Transfer Training OT LTG, Activity Type   sit to stand/stand to sit;toilet  -EL    Transfer Training OT LTG, [place] Level   contact guard assist;verbal cues required  -EL    Transfer Training OT LTG, Assist Device   walker, rolling  -EL    Transfer Training OT LTG, Outcome  goal ongoing  -SANDRA     Dynamic Standing Balance OT LTG    Dynamic Standing Balance OT LTG, Date Established   17  -EL    Dynamic Standing Balance OT LTG, Time to Achieve   by discharge  -EL    Dynamic Standing Balance OT LTG, [place] Level   contact guard assist  -EL    Dynamic Standing Balance OT  LTG, Assist Device   --   with AD/ UE support PRN   -EL    Dynamic Standing Balance OT LTG, Outcome  goal ongoing   still needs intermittent min assist  -SANDRA     Patient Education OT LTG    Patient Education OT LTG, Date Established  03/26/17  -SANDRA 03/26/17  -EL    Patient Education OT LTG, Time to Achieve   by discharge  -EL    Patient Education OT LTG, Education Type   HEP;home safety;energy conservation;work simplification  -EL    Patient Education OT LTG, Education Understanding   demonstrates adequately;verbalizes understanding  -EL    Patient Education OT LTG Outcome  goal ongoing   progressed with HEP, pacing self.  -SANDRA       User Key  (r) = Recorded By, (t) = Taken By, (c) = Cosigned By    Initials Name Provider Type    SANDRA Fuller, OT Occupational Therapist    DARRYL Merchant, OT Occupational Therapist    ASHLEE Knutson, OT Occupational Therapist                  OT Discharge Summary  Reason for Discharge: Discharge from facility  Outcomes Achieved: Refer to plan of care for updates on goals achieved  Discharge Destination: Inpatient rehabilitation facility      Charmaine Thomas OT  4/4/2017

## 2017-04-12 DIAGNOSIS — E87.1 HYPONATREMIA: ICD-10-CM

## 2017-04-12 RX ORDER — FLUDROCORTISONE ACETATE 0.1 MG/1
TABLET ORAL
Qty: 90 TABLET | Refills: 0 | OUTPATIENT
Start: 2017-04-12

## 2017-04-30 DIAGNOSIS — K21.9 GASTROESOPHAGEAL REFLUX DISEASE WITHOUT ESOPHAGITIS: ICD-10-CM

## 2017-04-30 DIAGNOSIS — I50.22 CHRONIC SYSTOLIC CONGESTIVE HEART FAILURE (HCC): ICD-10-CM

## 2017-05-01 RX ORDER — CARVEDILOL 3.12 MG/1
TABLET ORAL
Qty: 180 TABLET | Refills: 2 | OUTPATIENT
Start: 2017-05-01

## 2017-05-01 RX ORDER — OMEPRAZOLE 20 MG/1
CAPSULE, DELAYED RELEASE ORAL
Qty: 90 CAPSULE | Refills: 2 | OUTPATIENT
Start: 2017-05-01

## 2017-05-10 ENCOUNTER — TELEPHONE (OUTPATIENT)
Dept: FAMILY MEDICINE CLINIC | Facility: CLINIC | Age: 82
End: 2017-05-10

## 2017-05-15 ENCOUNTER — OFFICE VISIT (OUTPATIENT)
Dept: FAMILY MEDICINE CLINIC | Facility: CLINIC | Age: 82
End: 2017-05-15

## 2017-05-15 VITALS
HEIGHT: 70 IN | SYSTOLIC BLOOD PRESSURE: 114 MMHG | DIASTOLIC BLOOD PRESSURE: 66 MMHG | WEIGHT: 144 LBS | HEART RATE: 81 BPM | OXYGEN SATURATION: 96 % | BODY MASS INDEX: 20.62 KG/M2 | RESPIRATION RATE: 14 BRPM

## 2017-05-15 DIAGNOSIS — Z00.00 MEDICARE ANNUAL WELLNESS VISIT, SUBSEQUENT: Primary | ICD-10-CM

## 2017-05-15 DIAGNOSIS — E87.1 HYPONATREMIA: ICD-10-CM

## 2017-05-15 DIAGNOSIS — E03.8 OTHER SPECIFIED HYPOTHYROIDISM: ICD-10-CM

## 2017-05-15 DIAGNOSIS — I50.22 CHRONIC SYSTOLIC CONGESTIVE HEART FAILURE (HCC): ICD-10-CM

## 2017-05-15 DIAGNOSIS — K21.9 GASTROESOPHAGEAL REFLUX DISEASE WITHOUT ESOPHAGITIS: ICD-10-CM

## 2017-05-15 PROBLEM — R26.9 GAIT DISORDER: Chronic | Status: RESOLVED | Noted: 2017-03-25 | Resolved: 2017-05-15

## 2017-05-15 PROCEDURE — 96160 PT-FOCUSED HLTH RISK ASSMT: CPT | Performed by: FAMILY MEDICINE

## 2017-05-15 PROCEDURE — G0439 PPPS, SUBSEQ VISIT: HCPCS | Performed by: FAMILY MEDICINE

## 2017-05-15 PROCEDURE — 99397 PER PM REEVAL EST PAT 65+ YR: CPT | Performed by: FAMILY MEDICINE

## 2017-05-15 RX ORDER — FLUDROCORTISONE ACETATE 0.1 MG/1
0.1 TABLET ORAL 2 TIMES DAILY
Qty: 180 TABLET | Refills: 3 | Status: SHIPPED | OUTPATIENT
Start: 2017-05-15 | End: 2017-06-27 | Stop reason: SDUPTHER

## 2017-05-15 RX ORDER — LISINOPRIL 2.5 MG/1
2.5 TABLET ORAL DAILY
Qty: 90 TABLET | Refills: 3 | Status: SHIPPED | OUTPATIENT
Start: 2017-05-15 | End: 2017-08-11 | Stop reason: HOSPADM

## 2017-05-15 RX ORDER — OMEPRAZOLE 20 MG/1
20 CAPSULE, DELAYED RELEASE ORAL DAILY
Qty: 90 CAPSULE | Refills: 3 | Status: SHIPPED | OUTPATIENT
Start: 2017-05-15 | End: 2017-06-27 | Stop reason: SDUPTHER

## 2017-05-15 RX ORDER — BUMETANIDE 0.5 MG/1
0.5 TABLET ORAL DAILY
Qty: 90 TABLET | Refills: 3 | Status: SHIPPED | OUTPATIENT
Start: 2017-05-15 | End: 2017-08-11 | Stop reason: HOSPADM

## 2017-05-15 RX ORDER — POTASSIUM CHLORIDE 1500 MG/1
20 TABLET, FILM COATED, EXTENDED RELEASE ORAL DAILY
Qty: 90 TABLET | Refills: 3 | Status: SHIPPED | OUTPATIENT
Start: 2017-05-15 | End: 2017-08-11 | Stop reason: HOSPADM

## 2017-05-15 RX ORDER — LEVOTHYROXINE SODIUM 0.05 MG/1
50 TABLET ORAL DAILY
Qty: 90 TABLET | Refills: 3 | Status: SHIPPED | OUTPATIENT
Start: 2017-05-15 | End: 2017-06-27 | Stop reason: SDUPTHER

## 2017-05-15 RX ORDER — SPIRONOLACTONE 25 MG/1
25 TABLET ORAL DAILY
Qty: 90 TABLET | Refills: 3 | Status: SHIPPED | OUTPATIENT
Start: 2017-05-15 | End: 2017-08-11 | Stop reason: HOSPADM

## 2017-05-15 RX ORDER — CARVEDILOL 3.12 MG/1
3.12 TABLET ORAL 2 TIMES DAILY WITH MEALS
Qty: 180 TABLET | Refills: 3 | Status: SHIPPED | OUTPATIENT
Start: 2017-05-15 | End: 2017-06-27 | Stop reason: SDUPTHER

## 2017-06-14 RX ORDER — SPIRONOLACTONE 100 MG/1
TABLET, FILM COATED ORAL
Qty: 45 TABLET | Refills: 0 | OUTPATIENT
Start: 2017-06-14

## 2017-06-14 NOTE — TELEPHONE ENCOUNTER
----- Message from Marycarmen Thakur sent at 6/14/2017  9:06 AM EDT -----  Regarding: Home Health   Home health [facility]    They want to get him a gel mattress overlay, he is having some skin breakdowns    Wanted to see if could get rx for that      [phone]

## 2017-06-20 ENCOUNTER — TELEPHONE (OUTPATIENT)
Dept: FAMILY MEDICINE CLINIC | Facility: CLINIC | Age: 82
End: 2017-06-20

## 2017-06-20 NOTE — TELEPHONE ENCOUNTER
----- Message from Dedra Sanchez sent at 6/20/2017 11:18 AM EDT -----  LOAN     STATED THAT THEY STILL HAVE NOT RECEIVED SCRIPT OR NOTES FOR GEL MATTRESS OVERLAY    PLEASE FAX [phone] OR # [phone]      Faxed again 6/20/2017

## 2017-06-27 DIAGNOSIS — I50.22 CHRONIC SYSTOLIC CONGESTIVE HEART FAILURE (HCC): ICD-10-CM

## 2017-06-27 DIAGNOSIS — K21.9 GASTROESOPHAGEAL REFLUX DISEASE WITHOUT ESOPHAGITIS: ICD-10-CM

## 2017-06-27 DIAGNOSIS — E03.8 OTHER SPECIFIED HYPOTHYROIDISM: ICD-10-CM

## 2017-06-27 DIAGNOSIS — E87.1 HYPONATREMIA: ICD-10-CM

## 2017-06-27 RX ORDER — FLUDROCORTISONE ACETATE 0.1 MG/1
0.1 TABLET ORAL 2 TIMES DAILY
Qty: 180 TABLET | Refills: 3 | Status: SHIPPED | OUTPATIENT
Start: 2017-06-27

## 2017-06-27 RX ORDER — OMEPRAZOLE 20 MG/1
20 CAPSULE, DELAYED RELEASE ORAL DAILY
Qty: 90 CAPSULE | Refills: 3 | Status: SHIPPED | OUTPATIENT
Start: 2017-06-27 | End: 2017-08-11 | Stop reason: HOSPADM

## 2017-06-27 RX ORDER — LEVOTHYROXINE SODIUM 0.05 MG/1
50 TABLET ORAL DAILY
Qty: 90 TABLET | Refills: 3 | Status: SHIPPED | OUTPATIENT
Start: 2017-06-27

## 2017-06-27 RX ORDER — CARVEDILOL 3.12 MG/1
3.12 TABLET ORAL 2 TIMES DAILY WITH MEALS
Qty: 180 TABLET | Refills: 3 | Status: SHIPPED | OUTPATIENT
Start: 2017-06-27 | End: 2017-08-11 | Stop reason: HOSPADM

## 2017-07-03 ENCOUNTER — TELEPHONE (OUTPATIENT)
Dept: FAMILY MEDICINE CLINIC | Facility: CLINIC | Age: 82
End: 2017-07-03

## 2017-07-03 NOTE — TELEPHONE ENCOUNTER
----- Message from Zuleyka Urban MA sent at 6/30/2017  9:09 AM EDT -----  Regarding: WOUND CARE  PER HOME HEALTH WOUND REOPENED. RESTART WOUND CARE. MENTION VERBAL FOR WEEKLY CHECKS.  TALKED WITH MARYJANE AT HOME HEALTH CAN CALL WITH ADDITIONAL QUESTIONS [phone]

## 2017-07-17 ENCOUNTER — OFFICE VISIT (OUTPATIENT)
Dept: FAMILY MEDICINE CLINIC | Facility: CLINIC | Age: 82
End: 2017-07-17

## 2017-07-17 ENCOUNTER — HOSPITAL ENCOUNTER (OUTPATIENT)
Dept: GENERAL RADIOLOGY | Facility: HOSPITAL | Age: 82
Discharge: HOME | End: 2017-07-17
Attending: FAMILY MEDICINE | Admitting: FAMILY MEDICINE

## 2017-07-17 VITALS
SYSTOLIC BLOOD PRESSURE: 110 MMHG | HEIGHT: 70 IN | DIASTOLIC BLOOD PRESSURE: 62 MMHG | HEART RATE: 79 BPM | BODY MASS INDEX: 18.47 KG/M2 | RESPIRATION RATE: 16 BRPM | WEIGHT: 129 LBS | OXYGEN SATURATION: 98 %

## 2017-07-17 DIAGNOSIS — G20 PARKINSON'S DISEASE (HCC): Primary | ICD-10-CM

## 2017-07-17 DIAGNOSIS — S22.41XA CLOSED FRACTURE OF MULTIPLE RIBS OF RIGHT SIDE, INITIAL ENCOUNTER: ICD-10-CM

## 2017-07-17 PROCEDURE — 71100 X-RAY EXAM RIBS UNI 2 VIEWS: CPT

## 2017-07-17 PROCEDURE — 99215 OFFICE O/P EST HI 40 MIN: CPT | Performed by: FAMILY MEDICINE

## 2017-07-17 RX ORDER — HYDROCODONE BITARTRATE AND ACETAMINOPHEN 5; 325 MG/1; MG/1
1 TABLET ORAL EVERY 12 HOURS PRN
Qty: 24 TABLET | Refills: 0 | Status: SHIPPED | OUTPATIENT
Start: 2017-07-17

## 2017-07-17 RX ORDER — CARBIDOPA AND LEVODOPA 25; 100 MG/1; MG/1
1 TABLET, EXTENDED RELEASE ORAL 2 TIMES DAILY
Qty: 60 TABLET | Refills: 2 | Status: SHIPPED | OUTPATIENT
Start: 2017-07-17 | End: 2018-07-17

## 2017-07-17 NOTE — PROGRESS NOTES
"Subjective   Rio Chao Jr. is a 85 y.o. male.     History of Present Illness   He is accompanied by Monica today.  His daughter is [place] with her family.  The patient is here to discuss his tremors, cognitive declined, weight loss and general overall health.  He is in assisted living at [facility].  He describes challenges to his gait \"losing my strength.\"  He is not getting out of his apartment and feels like his strength is leaving him.  He describes decrease appetite and home health has identified about a 15 pound weight loss.  He is concerned with his left hand tremor, memory changes and gait inabilities.  He is using a walker.  In the bathroom this morning he slipped hitting his right ribs and right forearm on the commode.  He denies loss of consciousness, head injury, seizure or bowel/bladder incontinence.  EMT came out and wrapped his arm up secondary to some minor skin abrasions.  He is concerned with ongoing right rib pain.  He denies dyspnea, cough, hemoptysis or further falls.    Review of Systems   Constitutional: Positive for activity change, appetite change and fatigue. Negative for chills and fever.   HENT: Negative for nosebleeds and trouble swallowing.    Eyes: Negative for visual disturbance.   Respiratory: Negative for cough, shortness of breath and stridor.    Cardiovascular: Negative for chest pain, palpitations and leg swelling.   Gastrointestinal: Negative for abdominal pain, diarrhea, nausea and vomiting.   Genitourinary: Negative for difficulty urinating.   Musculoskeletal: Positive for back pain, gait problem and neck pain. Negative for arthralgias.   Skin: Positive for wound. Negative for pallor and rash.   Neurological: Positive for tremors. Negative for dizziness, seizures and headaches.   Hematological: Does not bruise/bleed easily.   Psychiatric/Behavioral: Negative for confusion and decreased concentration. The patient is nervous/anxious.        Objective "   Physical Exam   Constitutional: He is oriented to person, place, and time. He appears well-developed and well-nourished.   HENT:   Head: Normocephalic and atraumatic.   Right Ear: Decreased hearing is noted.   Left Ear: Decreased hearing is noted.   Nose: No nasal deformity.   Eyes: Conjunctivae are normal.   Neck: Neck supple. Muscular tenderness present. Carotid bruit is not present. No thyromegaly present.   Cardiovascular: Normal rate, regular rhythm and normal heart sounds.    Pulmonary/Chest: Effort normal and breath sounds normal.   Abdominal: Soft. Bowel sounds are normal. There is no tenderness.   Musculoskeletal: Normal range of motion.   Right lower ribs tender to palpation with no identified bruising, edema or skin breakdown   Neurological: He is alert and oriented to person, place, and time. He displays atrophy and tremor. No sensory deficit. He exhibits abnormal muscle tone. Coordination and gait abnormal.   Skin: Skin is warm and dry.   Minor abrasions to right lateral forearm x 3 all under one inch in size.   Psychiatric: Cognition and memory are impaired. He exhibits a depressed mood.   Nursing note and vitals reviewed.      Assessment/Plan   Diagnoses and all orders for this visit:    Parkinson's disease  -     Ambulatory Referral to Neurology  -     carbidopa-levodopa ER (SINEMET CR)  MG per tablet; Take 1 tablet by mouth 2 (Two) Times a Day.  - We discussed transitioning from assisted living to total care facility  - Home health to do calorie counts if possible (Nutritional Consult)  - Fall precautions  - Check CBC, CMP, TSH, Prealbumin  - Further recommendations pending lab results  - Follow up in 4 weeks or sooner if needed  - Telephone call to his daughter who is POA and is currently traveling  - Call connected to his daughter, we discussed his presentation today/office visit, self care concerns, gait concerns, weight changes and advised on enrolling into total care placement.  She  reported that he is DNR.    Closed fracture of multiple ribs of right side, initial encounter  -     XR ribs 2 vw right ordered to be done today  - Daily incentive spirometry  - Fall precautions  - Further recommendations pending x-ray results  -     HYDROcodone-acetaminophen 5-325 MG per tablet; Take 1 tablet by mouth Every 12 Hours PRN Pain .  - As part of this patient's treatment plan I am prescribing controlled substances. The patient has been made aware of appropriate use of such medications, including potential risk of somnolence, limited ability to drive and /or work safely, and potential for dependence or overdose. It has also been made clear that these medications are for use by this patient only, without concomitant use of alcohol or other substances unless prescribed.  VANESSA report has been reviewed  and scanned into the patient's chart. Date of last VANESSA: July 17, 2017 # [id number].

## 2017-08-02 RX ORDER — NYSTATIN 100000 U/G
CREAM TOPICAL 2 TIMES DAILY
Qty: 30 G | Refills: 5 | Status: SHIPPED | OUTPATIENT
Start: 2017-08-02 | End: 2017-08-11 | Stop reason: HOSPADM

## 2017-08-08 ENCOUNTER — TELEPHONE (OUTPATIENT)
Dept: FAMILY MEDICINE CLINIC | Facility: CLINIC | Age: 82
End: 2017-08-08

## 2017-08-08 NOTE — TELEPHONE ENCOUNTER
----- Message from Tim Bruce MD sent at 8/8/2017  8:06 AM EDT -----  Regarding: RE: [facility]   Please allow medical director to assume care.    ----- Message -----     From: Renetta Brunner MA     Sent: 8/7/2017   3:07 PM       To: Tim Bruce MD  Subject: FW: [facility]                                           ----- Message -----     From: Marycarmen Thakur     Sent: 8/7/2017   2:05 PM       To: Renetta Brunner MA  Subject: [facility]                                           [facility] called and want to know if dr bruce still wants to be his attending with [facility]      [phone]

## 2017-08-09 ENCOUNTER — APPOINTMENT (OUTPATIENT)
Dept: GENERAL RADIOLOGY | Facility: HOSPITAL | Age: 82
End: 2017-08-09

## 2017-08-09 ENCOUNTER — HOSPITAL ENCOUNTER (INPATIENT)
Facility: HOSPITAL | Age: 82
LOS: 1 days | Discharge: HOSPICE | End: 2017-08-10
Attending: EMERGENCY MEDICINE | Admitting: HOSPITALIST

## 2017-08-09 DIAGNOSIS — R53.1 GENERAL WEAKNESS: Primary | ICD-10-CM

## 2017-08-09 DIAGNOSIS — D72.829 LEUKOCYTOSIS, UNSPECIFIED TYPE: ICD-10-CM

## 2017-08-09 DIAGNOSIS — R41.89 COGNITIVE DECLINE: ICD-10-CM

## 2017-08-09 DIAGNOSIS — I95.9 HYPOTENSION, UNSPECIFIED HYPOTENSION TYPE: ICD-10-CM

## 2017-08-09 PROBLEM — D64.9 ANEMIA: Status: ACTIVE | Noted: 2017-08-09

## 2017-08-09 PROBLEM — R62.7 FAILURE TO THRIVE IN ADULT: Status: ACTIVE | Noted: 2017-08-09

## 2017-08-09 PROBLEM — I50.22 CHRONIC SYSTOLIC HEART FAILURE (HCC): Chronic | Status: ACTIVE | Noted: 2017-08-09

## 2017-08-09 PROBLEM — R62.7 FAILURE TO THRIVE IN ADULT: Chronic | Status: ACTIVE | Noted: 2017-08-09

## 2017-08-09 PROBLEM — G20 PARKINSON DISEASE (HCC): Chronic | Status: ACTIVE | Noted: 2017-08-09

## 2017-08-09 PROBLEM — I50.9 CHRONIC HEART FAILURE (HCC): Chronic | Status: ACTIVE | Noted: 2017-08-09

## 2017-08-09 LAB
BASOPHILS # BLD MANUAL: 0 10*3/MM3 (ref 0–0.2)
BASOPHILS NFR BLD AUTO: 0 % (ref 0–1)
DEPRECATED RDW RBC AUTO: 54.8 FL (ref 37–54)
EOSINOPHIL # BLD MANUAL: 0.17 10*3/MM3 (ref 0.1–0.3)
EOSINOPHIL NFR BLD MANUAL: 1 % (ref 0–3)
ERYTHROCYTE [DISTWIDTH] IN BLOOD BY AUTOMATED COUNT: 15.8 % (ref 11.3–14.5)
HCT VFR BLD AUTO: 33.1 % (ref 38.9–50.9)
HGB BLD-MCNC: 11.4 G/DL (ref 13.1–17.5)
LYMPHOCYTES # BLD MANUAL: 8.31 10*3/MM3 (ref 0.6–4.8)
LYMPHOCYTES NFR BLD MANUAL: 5 % (ref 0–12)
LYMPHOCYTES NFR BLD MANUAL: 50 % (ref 24–44)
MCH RBC QN AUTO: 32.6 PG (ref 27–31)
MCHC RBC AUTO-ENTMCNC: 34.4 G/DL (ref 32–36)
MCV RBC AUTO: 94.6 FL (ref 80–99)
MONOCYTES # BLD AUTO: 0.83 10*3/MM3 (ref 0–1)
NEUTROPHILS # BLD AUTO: 7.31 10*3/MM3 (ref 1.5–8.3)
NEUTROPHILS NFR BLD MANUAL: 44 % (ref 41–71)
PLAT MORPH BLD: NORMAL
PLATELET # BLD AUTO: 290 10*3/MM3 (ref 150–450)
PMV BLD AUTO: 10.2 FL (ref 6–12)
RBC # BLD AUTO: 3.5 10*6/MM3 (ref 4.2–5.76)
RBC MORPH BLD: NORMAL
SCAN SLIDE: NORMAL
TROPONIN I SERPL-MCNC: 0.07 NG/ML (ref 0–0.07)
WBC MORPH BLD: NORMAL
WBC NRBC COR # BLD: 16.61 10*3/MM3 (ref 3.5–10.8)
WHOLE BLOOD HOLD SPECIMEN: NORMAL
WHOLE BLOOD HOLD SPECIMEN: NORMAL

## 2017-08-09 PROCEDURE — 99223 1ST HOSP IP/OBS HIGH 75: CPT | Performed by: HOSPITALIST

## 2017-08-09 PROCEDURE — 84484 ASSAY OF TROPONIN QUANT: CPT

## 2017-08-09 PROCEDURE — 85025 COMPLETE CBC W/AUTO DIFF WBC: CPT | Performed by: EMERGENCY MEDICINE

## 2017-08-09 PROCEDURE — 85060 BLOOD SMEAR INTERPRETATION: CPT | Performed by: EMERGENCY MEDICINE

## 2017-08-09 PROCEDURE — 71010 HC CHEST PA OR AP: CPT

## 2017-08-09 PROCEDURE — 99285 EMERGENCY DEPT VISIT HI MDM: CPT

## 2017-08-09 PROCEDURE — 85007 BL SMEAR W/DIFF WBC COUNT: CPT | Performed by: EMERGENCY MEDICINE

## 2017-08-09 PROCEDURE — 93005 ELECTROCARDIOGRAM TRACING: CPT | Performed by: EMERGENCY MEDICINE

## 2017-08-09 RX ORDER — ACETAMINOPHEN 160 MG/5ML
650 SOLUTION ORAL ONCE
Status: COMPLETED | OUTPATIENT
Start: 2017-08-09 | End: 2017-08-09

## 2017-08-09 RX ORDER — SODIUM CHLORIDE 9 MG/ML
50 INJECTION, SOLUTION INTRAVENOUS CONTINUOUS
Status: DISCONTINUED | OUTPATIENT
Start: 2017-08-09 | End: 2017-08-10 | Stop reason: HOSPADM

## 2017-08-09 RX ORDER — SODIUM CHLORIDE 0.9 % (FLUSH) 0.9 %
10 SYRINGE (ML) INJECTION AS NEEDED
Status: DISCONTINUED | OUTPATIENT
Start: 2017-08-09 | End: 2017-08-10 | Stop reason: HOSPADM

## 2017-08-09 RX ORDER — ACETAMINOPHEN 325 MG/1
650 TABLET ORAL ONCE
Status: DISCONTINUED | OUTPATIENT
Start: 2017-08-09 | End: 2017-08-09

## 2017-08-09 RX ORDER — ACETAMINOPHEN 160 MG/5ML
SOLUTION ORAL
Status: COMPLETED
Start: 2017-08-09 | End: 2017-08-09

## 2017-08-09 RX ADMIN — ACETAMINOPHEN 650 MG: 160 SOLUTION ORAL at 21:29

## 2017-08-09 RX ADMIN — SODIUM CHLORIDE 1000 ML: 9 INJECTION, SOLUTION INTRAVENOUS at 19:55

## 2017-08-09 RX ADMIN — SODIUM CHLORIDE 1000 ML: 9 INJECTION, SOLUTION INTRAVENOUS at 23:40

## 2017-08-09 RX ADMIN — SODIUM CHLORIDE 1000 ML: 9 INJECTION, SOLUTION INTRAVENOUS at 23:36

## 2017-08-09 RX ADMIN — ACETAMINOPHEN 650 MG: 650 SOLUTION ORAL at 21:29

## 2017-08-09 NOTE — ED PROVIDER NOTES
Subjective   Patient is a 85 y.o. male presenting with weakness.   Weakness - Generalized   Associated symptoms: no abdominal pain, no chest pain, no cough, no diarrhea, no dizziness, no dysuria, no fever, no frequency, no headaches, no nausea, no seizures, no shortness of breath, no urgency and no vomiting      85-year-old male with a 2 day history of progressive weakness, fatigue.   He has been experiencing progressive weakness, gait disturbance, weight loss, and general physical decline per Dr. Jc.  He recently sustained a fall and fractured multiple ribs on the right side.  He has a sacral decubitus that is known.   Patient denies headache neck pain shortness of breath, hemoptysis, chest pain abdominal pain back pain lower extremity or pelvis pain.    Past medical history is remarkable for cognitive decline, tremors, parkinsonian symptoms, aortic stenosis, systolic heart failure sick sinus syndrome valvular heart disease adrenal insufficiency, hypothyroidism, dementia, gait disorder.  Per Dr. Jc, patient has been in a progressive decline, and was in the process of being evaluated for hospice.  He is currently in an assisted living facility, however family and Dr. Jc believe he needs hospice/total care.  Per Dr. Jc and family, he is DNR with supportive measures.    Review of Systems   Constitutional: Positive for activity change, appetite change and fatigue. Negative for chills, diaphoresis and fever.   HENT: Negative for congestion and sore throat.    Respiratory: Negative for cough, choking, chest tightness, shortness of breath and wheezing.    Cardiovascular: Negative for chest pain and leg swelling.   Gastrointestinal: Negative for abdominal distention, abdominal pain, blood in stool, constipation, diarrhea, nausea and vomiting.   Genitourinary: Negative for difficulty urinating, dysuria, flank pain, frequency, hematuria and urgency.   Musculoskeletal: Negative for neck stiffness.   Skin:  Positive for rash (sacral decubitus, pruritic rash to L lower abdomen).   Neurological: Positive for tremors and weakness. Negative for dizziness, seizures, syncope, facial asymmetry, speech difficulty, light-headedness, numbness and headaches.   Psychiatric/Behavioral: Negative for confusion.   All other systems reviewed and are negative.      Past Medical History:   Diagnosis Date   • Adrenal insufficiency    • Aortic stenosis    • Congestive heart failure    • Gait disorder     Chronic gait unsteadiness.   • GERD (gastroesophageal reflux disease)    • Hearing loss    • Humeral surgical neck fracture     Recent left humeral neck fracture, 12/09/2015, deemed nonoperative, treated with sling x4 weeks duration.   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     Occupational History   • Professor Retired     Social History Main Topics   • Smoking status: Never Smoker   • Smokeless tobacco: Never Used   • Alcohol use No   • Drug use: No   • Sexual activity: No     Other Topics Concern   • None     Social History Narrative           Objective   Physical Exam  "  Constitutional: He is oriented to person, place, and time. No distress.   Frail cachectic, but awake and alert, able to answer simple questions.  Oriented to person.    Blood pressure 8244, recheck 103/60   HENT:   Head: Normocephalic and atraumatic.   Right Ear: External ear normal.   Left Ear: External ear normal.   Nose: Nose normal.   Mouth/Throat: No oropharyngeal exudate.   Mucous membranes dry, decreased auditory acuity   Eyes: Conjunctivae and EOM are normal. Pupils are equal, round, and reactive to light. Right eye exhibits no discharge. Left eye exhibits no discharge. No scleral icterus.   Neck: Normal range of motion. Neck supple. No JVD present. No tracheal deviation present. No thyromegaly present.   Cardiovascular: Normal rate.    Pulmonary/Chest: Effort normal and breath sounds normal. No stridor. No respiratory distress. He has no wheezes. He has no rales. He exhibits no tenderness.   Abdominal: Soft. He exhibits no distension and no mass. There is no tenderness. There is no rebound and no guarding. No hernia.   Musculoskeletal: Normal range of motion. He exhibits no edema, tenderness or deformity.   Neurological: He is alert and oriented to person, place, and time. No cranial nerve deficit. He exhibits normal muscle tone. Coordination normal.   Cognitive and memory deficits noted, chronic.  He has tremor particularly to the left upper extremity.    General weakness, with no focal weakness   Skin: Skin is warm and dry. No rash noted. He is not diaphoretic. No erythema. No pallor.   Skin is warm and dry, very poor turgor.  He has a purpuric exanthem to the left flank and left lower abdomen no vesicles or zosteriform lesions.  Patient states area is \"itchy\" and appears to be excoriated.   Psychiatric: He has a normal mood and affect. His behavior is normal. Judgment and thought content normal.   Nursing note and vitals reviewed.      Procedures        Recent Results (from the past 24 hour(s))   CBC " Auto Differential    Collection Time: 08/09/17  8:24 PM   Result Value Ref Range    WBC 16.61 (H) 3.50 - 10.80 10*3/mm3    RBC 3.50 (L) 4.20 - 5.76 10*6/mm3    Hemoglobin 11.4 (L) 13.1 - 17.5 g/dL    Hematocrit 33.1 (L) 38.9 - 50.9 %    MCV 94.6 80.0 - 99.0 fL    MCH 32.6 (H) 27.0 - 31.0 pg    MCHC 34.4 32.0 - 36.0 g/dL    RDW 15.8 (H) 11.3 - 14.5 %    RDW-SD 54.8 (H) 37.0 - 54.0 fl    MPV 10.2 6.0 - 12.0 fL    Platelets 290 150 - 450 10*3/mm3   POC Troponin, Rapid    Collection Time: 08/09/17  8:26 PM   Result Value Ref Range    Troponin I 0.07 0.00 - 0.07 ng/mL     Note: In addition to lab results from this visit, the labs listed above may include labs taken at another facility or during a different encounter within the last 24 hours. Please correlate lab times with ED admission and discharge times for further clarification of the services performed during this visit.    XR Chest 1 View   Preliminary Result   No evidence of active chest disease.       DICTATED:     08/09/2017   EDITED:         08/09/2017                Vitals:    08/09/17 2000 08/09/17 2015 08/09/17 2030 08/09/17 2031   BP: (!) 84/52 95/51 101/73    Pulse:    80   Resp:       Temp:       TempSrc:       SpO2: 98% 97% 98% 97%   Weight:       Height:         Medications   sodium chloride 0.9 % flush 10 mL (not administered)   sodium chloride 0.9 % bolus 1,000 mL (1,000 mL Intravenous Rate/Dose Change 8/9/17 2115)   acetaminophen (TYLENOL) 160 MG/5ML solution 650 mg (650 mg Oral Given 8/9/17 2129)     ECG/EMG Results (last 24 hours)     Procedure Component Value Units Date/Time    ECG 12 Lead [[id number]] Collected:  08/09/17 1823     Updated:  08/09/17 1823            ED Course  ED Course   Comment By Time   Notified by RN that pt's daughter would not allow further phlebotomy. Tod Sandra PA-C 08/09 2029   Discussed with the patient's daughter, who would like patient admitted for comfort measures, with the expectation of hospice care. Tod  Kwadwo Sandra PA-C 08/09 2102   Called lab, not enough blood specimen to run CMP.  Discussed with patient's daughter who does not want further phlebotomy.  She also politely refused bladder catheterization. Tod Sandra PA-C 08/09 2113   Discussed with Dr. Omalley, who also evaluated patient and discussed with family. Tod Sandra PA-C 08/09 2114   D/W Dr. Butts, admit med-surg Tod Sandra PA-C 08/09 2151                  MDM    Final diagnoses:   General weakness   Leukocytosis, unspecified type   Cognitive decline   Hypotension, unspecified hypotension type            Tod Sandra PA-C  08/09/17 2153

## 2017-08-10 ENCOUNTER — HOSPITAL ENCOUNTER (INPATIENT)
Facility: HOSPITAL | Age: 82
LOS: 1 days | Discharge: HOSPICE | End: 2017-08-11
Attending: FAMILY MEDICINE | Admitting: INTERNAL MEDICINE

## 2017-08-10 VITALS
OXYGEN SATURATION: 95 % | HEIGHT: 71 IN | TEMPERATURE: 97.5 F | DIASTOLIC BLOOD PRESSURE: 35 MMHG | WEIGHT: 137 LBS | RESPIRATION RATE: 20 BRPM | SYSTOLIC BLOOD PRESSURE: 77 MMHG | BODY MASS INDEX: 19.18 KG/M2 | HEART RATE: 84 BPM

## 2017-08-10 DIAGNOSIS — I50.22 CHRONIC SYSTOLIC CONGESTIVE HEART FAILURE (HCC): ICD-10-CM

## 2017-08-10 PROBLEM — Z71.89 GOALS OF CARE, COUNSELING/DISCUSSION: Status: ACTIVE | Noted: 2017-08-10

## 2017-08-10 PROBLEM — G20 PD (PARKINSON'S DISEASE) (HCC): Status: ACTIVE | Noted: 2017-08-10

## 2017-08-10 LAB
CYTOLOGIST CVX/VAG CYTO: NORMAL
PATH INTERP BLD-IMP: NORMAL

## 2017-08-10 PROCEDURE — 99231 SBSQ HOSP IP/OBS SF/LOW 25: CPT | Performed by: HOSPITALIST

## 2017-08-10 RX ORDER — SODIUM CHLORIDE 9 MG/ML
50 INJECTION, SOLUTION INTRAVENOUS CONTINUOUS
Status: CANCELLED | OUTPATIENT
Start: 2017-08-10 | End: 2017-08-10

## 2017-08-10 RX ORDER — BISACODYL 5 MG/1
5 TABLET, DELAYED RELEASE ORAL DAILY PRN
Status: DISCONTINUED | OUTPATIENT
Start: 2017-08-10 | End: 2017-08-11 | Stop reason: HOSPADM

## 2017-08-10 RX ORDER — BISACODYL 5 MG/1
5 TABLET, DELAYED RELEASE ORAL DAILY PRN
Status: CANCELLED | OUTPATIENT
Start: 2017-08-10

## 2017-08-10 RX ORDER — HYDROCODONE BITARTRATE AND ACETAMINOPHEN 5; 325 MG/1; MG/1
0.5 TABLET ORAL EVERY 4 HOURS PRN
Status: CANCELLED | OUTPATIENT
Start: 2017-08-10 | End: 2017-08-20

## 2017-08-10 RX ORDER — HYDROCODONE BITARTRATE AND ACETAMINOPHEN 5; 325 MG/1; MG/1
1 TABLET ORAL EVERY 4 HOURS PRN
Status: DISCONTINUED | OUTPATIENT
Start: 2017-08-10 | End: 2017-08-10 | Stop reason: HOSPADM

## 2017-08-10 RX ORDER — SENNA AND DOCUSATE SODIUM 50; 8.6 MG/1; MG/1
2 TABLET, FILM COATED ORAL NIGHTLY PRN
Status: DISCONTINUED | OUTPATIENT
Start: 2017-08-10 | End: 2017-08-10 | Stop reason: HOSPADM

## 2017-08-10 RX ORDER — SODIUM CHLORIDE 0.9 % (FLUSH) 0.9 %
1-10 SYRINGE (ML) INJECTION AS NEEDED
Status: DISCONTINUED | OUTPATIENT
Start: 2017-08-10 | End: 2017-08-10 | Stop reason: HOSPADM

## 2017-08-10 RX ORDER — HYDROCODONE BITARTRATE AND ACETAMINOPHEN 5; 325 MG/1; MG/1
1 TABLET ORAL EVERY 4 HOURS PRN
Status: DISCONTINUED | OUTPATIENT
Start: 2017-08-10 | End: 2017-08-11 | Stop reason: HOSPADM

## 2017-08-10 RX ORDER — SIMETHICONE 80 MG
80 TABLET,CHEWABLE ORAL 4 TIMES DAILY PRN
Status: CANCELLED | OUTPATIENT
Start: 2017-08-10

## 2017-08-10 RX ORDER — BISACODYL 5 MG/1
5 TABLET, DELAYED RELEASE ORAL DAILY PRN
Status: DISCONTINUED | OUTPATIENT
Start: 2017-08-10 | End: 2017-08-10 | Stop reason: HOSPADM

## 2017-08-10 RX ORDER — LEVOTHYROXINE SODIUM 0.05 MG/1
50 TABLET ORAL DAILY
Status: DISCONTINUED | OUTPATIENT
Start: 2017-08-10 | End: 2017-08-10 | Stop reason: HOSPADM

## 2017-08-10 RX ORDER — ACETAMINOPHEN 160 MG/5ML
650 SOLUTION ORAL EVERY 4 HOURS PRN
Status: CANCELLED | OUTPATIENT
Start: 2017-08-10

## 2017-08-10 RX ORDER — ONDANSETRON 2 MG/ML
4 INJECTION INTRAMUSCULAR; INTRAVENOUS EVERY 6 HOURS PRN
Status: DISCONTINUED | OUTPATIENT
Start: 2017-08-10 | End: 2017-08-11 | Stop reason: HOSPADM

## 2017-08-10 RX ORDER — CARBIDOPA AND LEVODOPA 25; 100 MG/1; MG/1
1 TABLET, EXTENDED RELEASE ORAL 2 TIMES DAILY
Status: DISCONTINUED | OUTPATIENT
Start: 2017-08-10 | End: 2017-08-11 | Stop reason: HOSPADM

## 2017-08-10 RX ORDER — FLUDROCORTISONE ACETATE 0.1 MG/1
0.1 TABLET ORAL 2 TIMES DAILY
Status: CANCELLED | OUTPATIENT
Start: 2017-08-10

## 2017-08-10 RX ORDER — ONDANSETRON 2 MG/ML
4 INJECTION INTRAMUSCULAR; INTRAVENOUS EVERY 6 HOURS PRN
Status: DISCONTINUED | OUTPATIENT
Start: 2017-08-10 | End: 2017-08-10 | Stop reason: HOSPADM

## 2017-08-10 RX ORDER — ASPIRIN 81 MG/1
81 TABLET, CHEWABLE ORAL DAILY
Status: CANCELLED | OUTPATIENT
Start: 2017-08-11

## 2017-08-10 RX ORDER — ACETAMINOPHEN 160 MG/5ML
650 SOLUTION ORAL EVERY 4 HOURS PRN
Status: DISCONTINUED | OUTPATIENT
Start: 2017-08-10 | End: 2017-08-11 | Stop reason: HOSPADM

## 2017-08-10 RX ORDER — CARBIDOPA AND LEVODOPA 25; 100 MG/1; MG/1
1 TABLET, EXTENDED RELEASE ORAL 2 TIMES DAILY
Status: CANCELLED | OUTPATIENT
Start: 2017-08-10 | End: 2018-07-17

## 2017-08-10 RX ORDER — HYDROCODONE BITARTRATE AND ACETAMINOPHEN 5; 325 MG/1; MG/1
1 TABLET ORAL EVERY 4 HOURS PRN
Status: CANCELLED | OUTPATIENT
Start: 2017-08-10 | End: 2017-08-20

## 2017-08-10 RX ORDER — HYDROCODONE BITARTRATE AND ACETAMINOPHEN 5; 325 MG/1; MG/1
0.5 TABLET ORAL EVERY 4 HOURS PRN
Status: DISCONTINUED | OUTPATIENT
Start: 2017-08-10 | End: 2017-08-11 | Stop reason: HOSPADM

## 2017-08-10 RX ORDER — CARBIDOPA AND LEVODOPA 25; 100 MG/1; MG/1
1 TABLET, EXTENDED RELEASE ORAL 2 TIMES DAILY
Status: DISCONTINUED | OUTPATIENT
Start: 2017-08-10 | End: 2017-08-10 | Stop reason: HOSPADM

## 2017-08-10 RX ORDER — SODIUM CHLORIDE 0.9 % (FLUSH) 0.9 %
1-10 SYRINGE (ML) INJECTION AS NEEDED
Status: CANCELLED | OUTPATIENT
Start: 2017-08-10

## 2017-08-10 RX ORDER — LEVOTHYROXINE SODIUM 0.05 MG/1
50 TABLET ORAL DAILY
Status: CANCELLED | OUTPATIENT
Start: 2017-08-10

## 2017-08-10 RX ORDER — NYSTATIN 100000 U/G
CREAM TOPICAL 2 TIMES DAILY
Status: DISCONTINUED | OUTPATIENT
Start: 2017-08-10 | End: 2017-08-11

## 2017-08-10 RX ORDER — DOCUSATE SODIUM 100 MG/1
100 CAPSULE, LIQUID FILLED ORAL 2 TIMES DAILY PRN
Status: DISCONTINUED | OUTPATIENT
Start: 2017-08-10 | End: 2017-08-11 | Stop reason: HOSPADM

## 2017-08-10 RX ORDER — SIMETHICONE 80 MG
80 TABLET,CHEWABLE ORAL 4 TIMES DAILY PRN
Status: DISCONTINUED | OUTPATIENT
Start: 2017-08-10 | End: 2017-08-11 | Stop reason: HOSPADM

## 2017-08-10 RX ORDER — FLUDROCORTISONE ACETATE 0.1 MG/1
100 TABLET ORAL EVERY 12 HOURS SCHEDULED
Status: DISCONTINUED | OUTPATIENT
Start: 2017-08-10 | End: 2017-08-11 | Stop reason: HOSPADM

## 2017-08-10 RX ORDER — ASPIRIN 81 MG/1
81 TABLET, CHEWABLE ORAL DAILY
Status: DISCONTINUED | OUTPATIENT
Start: 2017-08-10 | End: 2017-08-10 | Stop reason: HOSPADM

## 2017-08-10 RX ORDER — FLUDROCORTISONE ACETATE 0.1 MG/1
0.1 TABLET ORAL 2 TIMES DAILY
Status: DISCONTINUED | OUTPATIENT
Start: 2017-08-10 | End: 2017-08-10 | Stop reason: HOSPADM

## 2017-08-10 RX ORDER — SENNA AND DOCUSATE SODIUM 50; 8.6 MG/1; MG/1
2 TABLET, FILM COATED ORAL NIGHTLY PRN
Status: DISCONTINUED | OUTPATIENT
Start: 2017-08-10 | End: 2017-08-11 | Stop reason: HOSPADM

## 2017-08-10 RX ORDER — HYDROCODONE BITARTRATE AND ACETAMINOPHEN 5; 325 MG/1; MG/1
0.5 TABLET ORAL EVERY 4 HOURS PRN
Status: DISCONTINUED | OUTPATIENT
Start: 2017-08-10 | End: 2017-08-10 | Stop reason: HOSPADM

## 2017-08-10 RX ORDER — LEVOTHYROXINE SODIUM 0.05 MG/1
50 TABLET ORAL
Status: DISCONTINUED | OUTPATIENT
Start: 2017-08-10 | End: 2017-08-11 | Stop reason: HOSPADM

## 2017-08-10 RX ORDER — NYSTATIN 100000 U/G
CREAM TOPICAL 2 TIMES DAILY
Status: CANCELLED | OUTPATIENT
Start: 2017-08-10 | End: 2017-09-01

## 2017-08-10 RX ORDER — LANOLIN ALCOHOL/MO/W.PET/CERES
800 CREAM (GRAM) TOPICAL DAILY
Status: CANCELLED | OUTPATIENT
Start: 2017-08-11

## 2017-08-10 RX ORDER — PANTOPRAZOLE SODIUM 40 MG/1
40 TABLET, DELAYED RELEASE ORAL
Status: DISCONTINUED | OUTPATIENT
Start: 2017-08-10 | End: 2017-08-10 | Stop reason: HOSPADM

## 2017-08-10 RX ORDER — CARBIDOPA AND LEVODOPA 25; 100 MG/1; MG/1
1 TABLET, EXTENDED RELEASE ORAL 2 TIMES DAILY
Status: DISCONTINUED | OUTPATIENT
Start: 2017-08-10 | End: 2017-08-10 | Stop reason: SDUPTHER

## 2017-08-10 RX ORDER — LEVOTHYROXINE SODIUM 0.05 MG/1
50 TABLET ORAL DAILY
Status: CANCELLED | OUTPATIENT
Start: 2017-08-11

## 2017-08-10 RX ORDER — ACETAMINOPHEN 160 MG/5ML
650 SOLUTION ORAL EVERY 4 HOURS PRN
Status: DISCONTINUED | OUTPATIENT
Start: 2017-08-10 | End: 2017-08-10 | Stop reason: HOSPADM

## 2017-08-10 RX ORDER — LANOLIN ALCOHOL/MO/W.PET/CERES
800 CREAM (GRAM) TOPICAL DAILY
Status: DISCONTINUED | OUTPATIENT
Start: 2017-08-10 | End: 2017-08-10 | Stop reason: HOSPADM

## 2017-08-10 RX ORDER — SIMETHICONE 80 MG
80 TABLET,CHEWABLE ORAL 4 TIMES DAILY PRN
Status: DISCONTINUED | OUTPATIENT
Start: 2017-08-10 | End: 2017-08-10 | Stop reason: HOSPADM

## 2017-08-10 RX ORDER — NYSTATIN 100000 U/G
CREAM TOPICAL 2 TIMES DAILY
Status: DISCONTINUED | OUTPATIENT
Start: 2017-08-10 | End: 2017-08-10 | Stop reason: HOSPADM

## 2017-08-10 RX ORDER — SENNA AND DOCUSATE SODIUM 50; 8.6 MG/1; MG/1
2 TABLET, FILM COATED ORAL NIGHTLY PRN
Status: CANCELLED | OUTPATIENT
Start: 2017-08-10

## 2017-08-10 RX ORDER — ONDANSETRON 2 MG/ML
4 INJECTION INTRAMUSCULAR; INTRAVENOUS EVERY 6 HOURS PRN
Status: CANCELLED | OUTPATIENT
Start: 2017-08-10

## 2017-08-10 RX ORDER — DOCUSATE SODIUM 100 MG/1
100 CAPSULE, LIQUID FILLED ORAL 2 TIMES DAILY PRN
Status: DISCONTINUED | OUTPATIENT
Start: 2017-08-10 | End: 2017-08-10 | Stop reason: HOSPADM

## 2017-08-10 RX ORDER — DOCUSATE SODIUM 100 MG/1
100 CAPSULE, LIQUID FILLED ORAL 2 TIMES DAILY PRN
Status: CANCELLED | OUTPATIENT
Start: 2017-08-10

## 2017-08-10 RX ORDER — SODIUM CHLORIDE 0.9 % (FLUSH) 0.9 %
10 SYRINGE (ML) INJECTION AS NEEDED
Status: CANCELLED | OUTPATIENT
Start: 2017-08-10

## 2017-08-10 RX ORDER — PANTOPRAZOLE SODIUM 40 MG/1
40 TABLET, DELAYED RELEASE ORAL
Status: CANCELLED | OUTPATIENT
Start: 2017-08-11

## 2017-08-10 RX ADMIN — HYDROCODONE BITARTRATE AND ACETAMINOPHEN 1 TABLET: 5; 325 TABLET ORAL at 20:55

## 2017-08-10 RX ADMIN — SODIUM CHLORIDE 50 ML/HR: 9 INJECTION, SOLUTION INTRAVENOUS at 02:00

## 2017-08-10 RX ADMIN — ASPIRIN 81 MG 81 MG: 81 TABLET ORAL at 08:14

## 2017-08-10 RX ADMIN — NYSTATIN: 100000 CREAM TOPICAL at 08:15

## 2017-08-10 RX ADMIN — PANTOPRAZOLE SODIUM 40 MG: 40 TABLET, DELAYED RELEASE ORAL at 06:23

## 2017-08-10 RX ADMIN — CARBIDOPA AND LEVODOPA 1 TABLET: 25; 100 TABLET, EXTENDED RELEASE ORAL at 08:14

## 2017-08-10 RX ADMIN — FLUDROCORTISONE ACETATE 0.1 MG: 0.1 TABLET ORAL at 08:14

## 2017-08-10 RX ADMIN — HYDROCODONE BITARTRATE AND ACETAMINOPHEN 0.5 TABLET: 5; 325 TABLET ORAL at 16:52

## 2017-08-10 RX ADMIN — NYSTATIN: 100000 CREAM TOPICAL at 18:34

## 2017-08-10 RX ADMIN — HYDROCODONE BITARTRATE AND ACETAMINOPHEN 1 TABLET: 5; 325 TABLET ORAL at 02:08

## 2017-08-10 RX ADMIN — ACETAMINOPHEN 800 MCG: 400 TABLET ORAL at 08:15

## 2017-08-10 RX ADMIN — SODIUM CHLORIDE 50 ML/HR: 9 INJECTION, SOLUTION INTRAVENOUS at 08:15

## 2017-08-10 RX ADMIN — Medication 5 MG: at 02:08

## 2017-08-10 RX ADMIN — CARBIDOPA AND LEVODOPA 1 TABLET: 25; 100 TABLET, EXTENDED RELEASE ORAL at 18:10

## 2017-08-10 RX ADMIN — LEVOTHYROXINE SODIUM 50 MCG: 50 TABLET ORAL at 06:23

## 2017-08-10 RX ADMIN — SODIUM CHLORIDE 500 ML: 9 INJECTION, SOLUTION INTRAVENOUS at 02:30

## 2017-08-10 RX ADMIN — FLUDROCORTISONE ACETATE 100 MCG: 0.1 TABLET ORAL at 20:55

## 2017-08-10 RX ADMIN — SODIUM CHLORIDE 500 ML: 9 INJECTION, SOLUTION INTRAVENOUS at 04:25

## 2017-08-10 RX ADMIN — LEVOTHYROXINE SODIUM 50 MCG: 50 TABLET ORAL at 14:39

## 2017-08-10 NOTE — PLAN OF CARE
Problem: Patient Care Overview (Adult)  Goal: Plan of Care Review  Outcome: Ongoing (interventions implemented as appropriate)    08/10/17 0365   Coping/Psychosocial Response Interventions   Plan Of Care Reviewed With patient   Patient Care Overview   Progress progress toward functional goals as expected

## 2017-08-10 NOTE — PROGRESS NOTES
Discharge Planning Assessment  [facility]     Patient Name: Rio Chao Jr.  MRN: [id number]  Today's Date: 8/10/2017    Admit Date: 8/10/2017          Discharge Needs Assessment       08/10/17 1142    Living Environment    Lives With facility resident    Living Arrangements extended care facility    Home Accessibility no concerns    Stair Railings at Home none    Transportation Available car;family or friend will provide    Living Environment    Provides Primary Care For no one, unable/limited ability to care for self    Primary Care Provided By child(lucas) (specify)    Unique Family Situation Patient was in assisted living wing at [facility] however is now being turned over to Hospice care and they will arrange for LTC bed     Quality Of Family Relationships supportive;helpful;involved    Able to Return to Prior Living Arrangements no    Living Arrangement Comments Needs LTC bed at [facility]     Discharge Needs Assessment    Concerns To Be Addressed adjustment to diagnosis/illness concerns    Readmission Within The Last 30 Days no previous admission in last 30 days    Anticipated Changes Related to Illness none    Equipment Currently Used at Home hospital bed;power chair, (recliner lift);raised toilet;walker, rolling    Equipment Needed After Discharge none    Discharge Facility/Level Of Care Needs hospice facility    Discharge Disposition hospice/home;hospice/medical facility            Discharge Plan       08/10/17 1145    Case Management/Social Work Plan    Plan LTC with Hospice    Patient/Family In Agreement With Plan yes    Additional Comments Spoke with patients daughter at bedside. He arrived from assisted living at [facility] , has now been turned over to Hospice who will help arrange for LTC bed. CM available to assist as needed - sudheer alfredo rn/cm [phone]         Discharge Placement     No information found                Demographic Summary       08/10/17  1140    Referral Information    Admission Type inpatient    Arrived From skilled nursing facility    Referral Source admission list    Reason For Consult discharge planning    Record Reviewed history and physical;medical record    Contact Information    Permission Granted to Share Information With     Primary Care Physician Information    Name Bessy             Functional Status       08/10/17 1141    Functional Status Current    Current Functional Level Comment Please see nurses notes     Functional Status Prior    Ambulation 3-->assistive equipment and person    Transferring 3-->assistive equipment and person    Toileting 3-->assistive equipment and person    Bathing 3-->assistive equipment and person    Dressing 3-->assistive equipment and person    Eating 0-->independent    Communication 2-->difficulty understanding (not related to language barrier)    Swallowing 0-->swallows foods/liquids without difficulty    IADL    Medications completely dependent    Meal Preparation completely dependent    Housekeeping completely dependent    Laundry completely dependent    Shopping completely dependent    Oral Care assistive person    Activity Tolerance    Usual Activity Tolerance poor    Current Activity Tolerance poor    Employment/Financial    Employment/Finance Comments [employer] coverage             Psychosocial     None            Abuse/Neglect     None            Legal     None            Substance Abuse     None            Patient Forms     None          Bonnie Fortune RN

## 2017-08-10 NOTE — PLAN OF CARE
Problem: Fall Risk (Adult)  Goal: Identify Related Risk Factors and Signs and Symptoms  Outcome: Ongoing (interventions implemented as appropriate)    08/10/17 4202   Fall Risk   Fall Risk: Related Risk Factors age-related changes;gait/mobility problems;history of falls;sleep pattern alteration;slipper/uneven surfaces;sensory deficits;environment unfamiliar   Fall Risk: Signs and Symptoms presence of risk factors

## 2017-08-10 NOTE — H&P
[facility] Medicine Services  HISTORY AND PHYSICAL    Primary Care Physician: Tim Doherty MD   Cardiology: Dr. Hayde Johns  Nephrologist: Dr. Caitlyn Sierra    Subjective     Chief Complaint: weakness, failure to thrive.    History of Present Illness:     Mr. Jeremie Seay was sent to the ED this evening by EMS after his assisted living facility called them for weakness and failure to thrive. Chronic of years, more decline in last months. Severe. Associated with advancing parkinsons, progressing dementia, and multiple uncurable comorbidities. He is found to be notably hypotensive with SBP 60-80 range.    Mr. Chao's daughter Caryn is present. She reports they are working on getting pt into LTC bed ASAP r/t chronic decline and inability to live alone. She met with Destiny Herrera and they are letting her know in the morning when a bed will be available for him. She notes that they looking for purely comfort care while here. They just joined hospice yesterday and wish to continue with their care. She reports she would like pt to have more IV fluids for purpose of comfort as he is already feeling much better after sL boluses in the ED. She would also like to ensure he continues to receive tylenol for his chronic pain.    Mr. Chao will be admitted to [facility] for further care, with consult to hospice.    Review of Systems   Unable to perform ROS: Dementia      Otherwise complete ROS performed and negative except as mentioned in the HPI.    Past Medical History:   Diagnosis Date   • Adrenal insufficiency    • Anemia 8/9/2017   • Aortic stenosis    • Chronic heart failure 8/9/2017   • Congestive heart failure    • Failure to thrive in adult 8/9/2017   • Gait disorder     Chronic gait unsteadiness.   • GERD (gastroesophageal reflux disease)    • Goals of care, counseling/discussion 8/10/2017   • Hearing loss    • Humeral surgical neck fracture     Recent left humeral neck fracture,  12/09/2015, deemed nonoperative, treated with sling x4 weeks duration.   • Hypogonadism male    • Hypokalemia    • Hyponatremia    • Hypotension 8/9/2017   • Hypothyroidism    • Left humeral fracture 12/2015   • Leukocytosis 8/9/2017   • Lower extremity edema    • Mild dementia    • Osteoarthrosis    • Parkinson disease 8/9/2017   • Seasonal allergic rhinitis    • Sick sinus syndrome    • Systolic heart failure    • Tear of right biceps muscle    • Valvular heart disease    • Venous insufficiency    • Vitamin B12 deficiency    • Vitamin D deficiency    • Weakness 8/9/2017       Past Surgical History:   Procedure Laterality Date   • AORTIC VALVE REPAIR/REPLACEMENT  07/2013    TAVR    • CARDIAC CATHETERIZATION  2013   • CARDIAC PACEMAKER PLACEMENT     • KNEE SURGERY         Family History   Problem Relation Age of Onset   • Heart disease Father    • Stroke Father        Social History     Social History   • Marital status:      Spouse name: N/A   • Number of children: 2   • Years of education: Ph.D.     Occupational History   • Professor Retired     Social History Main Topics   • Smoking status: Never Smoker   • Smokeless tobacco: Never Used   • Alcohol use No   • Drug use: No   • Sexual activity: No     Other Topics Concern   • Not on file     Social History Narrative    Mr. Janee Seay is a pleasant 85 year old white  male. His daughter and son are his healthcare surrogates. Mr. Chao joined hospice on 8/8/17 - they are looking for purely comfort care, and in process of transitioning pt from assisted living at [facility] to their LTC building.       Medications:    acetaminophen (TYLENOL) 325 MG tablet Take 2 tablets by mouth Every 6 (Six) Hours As Needed for Mild Pain (1-3).          aspirin 81 MG tablet Take 1 tablet by mouth daily.         bumetanide (BUMEX) 0.5 MG tablet Take 1 tablet by mouth Daily.         carbidopa-levodopa ER (SINEMET CR)  MG per tablet Take 1 tablet by  "mouth 2 (Two) Times a Day.         carvedilol (COREG) 3.125 MG tablet Take 1 tablet by mouth 2 (Two) Times a Day With Meals.         cyanocobalamin 1000 MCG/ML injection Inject 1 mL into the shoulder, thigh, or buttocks Every 28 (Twenty-Eight) Days.          Patient taking differently: Inject 1,000 mcg into the shoulder, thigh, or buttocks Every 28 (Twenty-Eight) Days. Taken the 1st of every month         fludrocortisone 0.1 MG tablet Take 1 tablet by mouth 2 (Two) Times a Day.         levothyroxine (SYNTHROID, LEVOTHROID) 50 MCG tablet Take 1 tablet by mouth Daily.         lisinopril (PRINIVIL,ZESTRIL) 2.5 MG tablet Take 1 tablet by mouth Daily.         omeprazole (priLOSEC) 20 MG capsule Take 1 capsule by mouth Daily.         potassium chloride ER (K-TAB) 20 MEQ tablet controlled-release ER tablet Take 1 tablet by mouth Daily.         spironolactone (ALDACTONE) 25 MG tablet Take 1 tablet by mouth Daily.         Ergocalciferol (VITAMIN D2) 2000 UNITS tablet Take 1 tablet by mouth daily.         folic acid (FOLVITE) 800 MCG tablet Take 1 tablet by mouth daily.         HYDROcodone-acetaminophen (NORCO) 5-325 MG per tablet Take 1 tablet by mouth Every 12 (Twelve) Hours As Needed for Moderate Pain .         nystatin (MYCOSTATIN) 343286 UNIT/GM cream Apply  topically 2 (Two) Times a Day for 30 days.        Allergies:  Allergies   Allergen Reactions   • Penicillins Anaphylaxis         Objective     Physical Exam:  Vital Signs: BP (!) 77/47  Pulse 106  Temp 97.6 °F (36.4 °C) (Oral)   Resp 20  Ht 71\" (180.3 cm)  Wt 129 lb (58.5 kg)  SpO2 97%  BMI 17.99 kg/m2  Physical Exam   Constitutional: No distress.   Frail, thin, emaciated. Elderly, debilitated.   HENT:   Head: Normocephalic and atraumatic.   Mouth/Throat: Oropharynx is clear and moist. No oropharyngeal exudate.   MM pink/dry.   Eyes: Conjunctivae and EOM are normal. Pupils are equal, round, and reactive to light. Right eye exhibits no discharge. Left eye " exhibits no discharge. No scleral icterus.   Neck: No JVD present. No tracheal deviation present.   Cardiovascular: Normal rate, regular rhythm, normal heart sounds and intact distal pulses.    No murmur heard.  Pulmonary/Chest: Effort normal and breath sounds normal. No respiratory distress. He has no wheezes. He has no rales. He exhibits no tenderness.   Diminished/shallow all lobes but clear.   Abdominal: Soft. Bowel sounds are normal. He exhibits no distension. There is no tenderness. There is no guarding.   Flat.   Genitourinary:   Genitourinary Comments: Bladder non-distended, non-tender.   Musculoskeletal: He exhibits no edema or deformity.   Neurological: He is alert.   Oriented to person only.   Skin: Skin is warm and dry. No rash noted. He is not diaphoretic. No erythema. No pallor.   Pt cold, warm blankets applied per request.   Psychiatric: He has a normal mood and affect. His behavior is normal. Judgment and thought content normal.   Pleasantly confused. Calm, relaxed, cooperative, engages, pleasant.       Results Reviewed:    Lab Results (last 24 hours)     Procedure Component Value Units Date/Time    CBC & Differential [[id number]] Collected:  08/09/17 2024    Specimen:  Blood Updated:  08/09/17 2216    Narrative:       The following orders were created for panel order CBC & Differential.  Procedure                               Abnormality         Status                     ---------                               -----------         ------                     Manual Differential[[id number]]          Abnormal            Final result               Scan Slide[[id number]]                                       Final result               CBC Auto Differential[[id number]]        Abnormal            Final result               Slide Review, Hematology[[id number]]                         In process                   Please view results for these tests on the individual orders.    CBC Auto Differential [[id number]]   (Abnormal) Collected:  08/09/17 2024    Specimen:  Blood Updated:  08/09/17 2216     WBC 16.61 (H) 10*3/mm3       Corrected result. Previous result was 17.01 10*3/mm3 on 8/9/2017 at 2036 EDT        RBC 3.50 (L) 10*6/mm3       Corrected result. Previous result was 3.54 10*6/mm3 on 8/9/2017 at 2036 EDT        Hemoglobin 11.4 (L) g/dL      Hematocrit 33.1 (L) %       Corrected result. Previous result was 33.6 % on 8/9/2017 at 2036 EDT        MCV 94.6 fL       Corrected result. Previous result was 94.9 fL on 8/9/2017 at 2036 EDT        MCH 32.6 (H) pg       Corrected result. Previous result was 32.2 pg on 8/9/2017 at 2036 EDT        MCHC 34.4 g/dL       Corrected result. Previous result was 33.9 g/dL on 8/9/2017 at 2036 EDT        RDW 15.8 (H) %      RDW-SD 54.8 (H) fl       Corrected result. Previous result was 55.4 fl on 8/9/2017 at 2036 EDT        MPV 10.2 fL       Corrected result. Previous result was 9.7 fL on 8/9/2017 at 2036 EDT        Platelets 290 10*3/mm3       Corrected result. Previous result was 282 10*3/mm3 on 8/9/2017 at 2036 EDT       Scan Slide [[id number]] Collected:  08/09/17 2024    Specimen:  Blood Updated:  08/09/17 2216     Scan Slide --      See Manual Differential Results       Manual Differential [[id number]]  (Abnormal) Collected:  08/09/17 2024    Specimen:  Blood Updated:  08/09/17 2216     Neutrophil % 44.0 %      Lymphocyte % 50.0 (H) %      Monocyte % 5.0 %      Eosinophil % 1.0 %      Basophil % 0.0 %      Neutrophils Absolute 7.31 10*3/mm3      Lymphocytes Absolute 8.31 (H) 10*3/mm3      Monocytes Absolute 0.83 10*3/mm3      Eosinophils Absolute 0.17 10*3/mm3      Basophils Absolute 0.00 10*3/mm3      RBC Morphology Normal     WBC Morphology Normal     Platelet Morphology Normal    Slide Review, Hematology [[id number]] Collected:  08/09/17 2024    Specimen:  Blood Updated:  08/09/17 2214    POC Troponin, Rapid [[id number]]  (Normal) Collected:  08/09/17 2026    Specimen:  Blood Updated:   08/09/17 2045     Troponin I 0.07 ng/mL       Serial Number: [id number]:  [id number]           ECG  Vent. rate 75 BPM  NE interval * ms  QRS duration 154 ms  QT/QTc 472/527 ms  P-R-T axes * 211 68  Electronic ventricular pacemaker  When compared with ECG of 24-MAR-2017 22:53,  Vent. rate has decreased BY 14 BPM       EXAMINATION: XR CHEST, SINGLE VIEW - 08/09/2017      INDICATION: Weakness, dizziness, altered mental status.      COMPARISON: 7/17/2017 rib series films. 3/24/2017 portable chest  radiograph.      FINDINGS: Note is made of patient's old left shoulder fracture and  deformity. There is advanced shoulder joint DJD bilaterally. Left-sided  triple-lead pacemaker is again noted. The heart is upper normal size.  The vasculature appears normal. Lungs appear clear bilaterally.          IMPRESSION:  No evidence of active chest disease.    I have personally reviewed and interpreted available lab data, radiology studies and ECG obtained at time of admission.     Assessment / Plan     Problem List:   Hospital Problem List     * (Principal)Failure to thrive in adult (Chronic)    Valvular heart disease (Chronic)    Overview Signed 10/24/2016 12:05 PM by Perez Archer     1. Valvular heart disease:  a. Severe aortic stenosis, status post TAVR, 2013, data deficit, (performed at [facility]).           Dementia (Chronic)    Weakness (Chronic)    Leukocytosis    Anemia    Parkinson disease (Chronic)    Chronic systolic heart failure (Chronic)    Hypotension    Goals of care, counseling/discussion          Assessment / Plan:    1. Failure to thrive / weakness:  - Chronic issue, may have some exac by notable hypotension.  - DNR, CMO, no further diagnostics or lab draws. Treat to comfort only.  - Family would like IV fluids at this time for comfort.  - Consult hospice/ to assist with LTC transition.  - Consult/consider inpatient hospice for symptom management.    2. Hypotension:  - 2L boluses in ED,  additional liter slower, then NS @ 50 ml/hr.  - Monitor for s/s fluid volume excess and hold fluids if occurs - systolic heart failure. LCTA, no edema.  - No ICU/pressors or escalation of care.  - Hold BB, ACEI, bumex, aldactone. May need dose reductions at discharge, except chronic dehydration.    3. Parkinson's disease / dementia:  - See goals of care.    4. Chronic systolic heart failure / valvular heart disease:  - Monitor IV fluids.    5. Leukocytosis:  - Treat to comfort, NOT TO DIAGNOSTICS.  - CXR negative, urine pending. Up to family if they would want abx if indicated, they are aware and can discuss with them if result positive.    6. Anemia:  - Monitor.    7. Goals of care:  - Living will reviewed with daughter. She has good knowledge of pt status and wishes. Daughter and son as surrogates.  - DNR, comfort measures only. Okay for IV fluids at this time, to discuss if would want abx if indicated in future. Plan to move to LTC. Continue hospice care.  - Comfort meds added to orders, staff to call for any uncontrolled comfort.    DVT prophylaxis: none, comfort measures.    Code Status: DO NOT RESUSCITATE. Healthcare surrogates: daughter Caryn Brannon and son Rio Robisont III.    Admission Status: Patient will be admitted to INPATIENT status due to the need for care which can only be reasonably provided in an hospital setting such as aggressive/expedited ancillary services and/or consultation services and the necessity for IV medications, close physician monitoring and/or the possible need for procedures.  In such, I feel patient’s risk for adverse outcomes and need for care warrant INPATIENT evaluation and predict the patient’s care encounter to likely last beyond 2 midnights.     Gladys Freeman, APRN 08/10/17 12:01 AM      Brief Attending Note       I have seen and examined the patient, performing an independent face-to-face diagnostic evaluation with plan of care reviewed and developed with the  advanced practice clinician (APC).      Brief Summary Statement/HPI:   86 yo M with hx of progressive dementia and Parkinson's presents due to continued cognitive and functional decline. He is actually in the process of transitioning to Hospice as an outpatient. In the ER he was noted to be hypotensive and was given IV fluids. Family requested no aggressive workup, no labs. Desire comfort care and Hospice consult in AM. Currently patient is awake, lying in bed in no acute distress. Complains of being cold. No pain. Denies chest pain or dyspnea.       Attending Physical Exam:  Temp:  [97.4 °F (36.3 °C)-97.6 °F (36.4 °C)] 97.4 °F (36.3 °C)  Heart Rate:  [] 81  Resp:  [16-20] 16  BP: ()/(36-81) 80/50  Constitutional: no acute distress, awake, alert, thin and frail appearance  Eyes: PERRLA, sclerae anicteric, no conjunctival injection  Neck: supple, no thyromegaly, trachea midline  Respiratory: Clear to auscultation bilaterally, nonlabored respirations   Cardiovascular: RRR, no murmurs, rubs, or gallops, palpable pedal pulses bilaterally  Gastrointestinal: Positive bowel sounds, soft, nontender, nondistended  Musculoskeletal: No bilateral ankle edema, no clubbing or cyanosis to bilateral lower extremities  Psychiatric: oriented x 1, appropriate affect, cooperative  Neurologic: Strength symmetric in all extremities, Cranial Nerves grossly intact to confrontation, pleasantly confused      Brief Assessment/Plan:  86 yo M with hx of progressive dementia and Parkinson's presents due to continued cognitive and functional decline.     PLAN:  --Continue IV hydration overnight per family request.   --Consult Hospice in AM.   --No lab draws. Patient is comfort measures only.     See above for further detailed assessment and plan developed with APC which I have reviewed and/or edited.    I believe this patient meets INPATIENT status due to the need for care which can only be reasonably provided in an hospital setting  such as aggressive/expedited ancillary services and/or consultation services and the necessity for IV medications, close physician monitoring and/or the possible need for procedures.  In such, I feel patient’s risk for adverse outcomes and need for care warrant INPATIENT evaluation and predict the patient’s care encounter to likely last beyond 2 midnights.        Kelley Butts MD  08/10/17  1:49 AM

## 2017-08-10 NOTE — PROGRESS NOTES
Continued Stay Note  [facility]     Patient Name: Rio Chao Jr.  MRN: [id number]  Today's Date: 8/10/2017    Admit Date: 8/10/2017          Discharge Plan       08/10/17 1236    Case Management/Social Work Plan    Plan [facility] inpatient hospice    Additional Comments Chart reviewed.  Pt is a home [facility] pt who was brought in last night for weakness.  Plan is to send him back to [facility] to a LTC bed.  He had been living in the assisted living portion of [facility] and is too weak to stay by himself at this point.   Records faxed to Elizabeth at [facility].  Pt discharged from hospitalist service by  and readmitted to hospice inpt service to Dr. Ruelas.  Hospice team to faciliate pt's discharge to [facility].  He will continue there with hospice.  Home hospice team is aware of pt's plan.  Spoke with , Deirdre and made her aware of pt's transition to inpt hospice.  Hospice # provided to staff nurse, Clarita and requested she call with any needs, questions, or concerns.  If hospice RN can be of further assist, call ext [phone].        08/10/17 1144    Case Management/Social Work Plan    Plan LTC with Hospice    Patient/Family In Agreement With Plan yes    Additional Comments Spoke with patients daughter at bedside. He arrived from assisted living at [facility] , has now been turned over to Hospice who will help arrange for LTC bed. CM available to assist as needed - sudheer alfredo rn/cm [phone]               Discharge Codes     None            Talita Del Cid RN

## 2017-08-10 NOTE — PROGRESS NOTES
"Adult Nutrition  Assessment/PES    Patient Name:  Rio Chao Jr.  YOB: 1932  MRN: [id number]  Admit Date:  8/10/2017    Assessment Date:  8/10/2017        Reason for Assessment       08/10/17 1524    Reason for Assessment    Reason For Assessment/Visit identified at risk by screening criteria    Identified At Risk By Screening Criteria MST SCORE 2+    Time Spent (min) 20    Diagnosis Diagnosis    Cardiac VHD;HTN;CHF    Hematological Anemia    Neurological Dementia;Parkinson's    Other diagnosis Failure to thrive in adults; Leukocytosis; Weakness              Nutrition/Diet History       08/10/17 1527    Nutrition/Diet History    Reported/Observed By Patient    Appetite Fair    Other Pt reported UBW to be 145 lbs before beginning to lose weight. Pt reported losing around 5-10 lbs over 3 months. Pt reported appetite to be normal during that time, no diet or exercise changes, pt reported wt loss to be unintentional.             Anthropometrics       08/10/17 1529    Anthropometrics    Height 180.3 cm (71\")    Weight 62.1 kg (137 lb)    Ideal Body Weight (IBW)    Ideal Body Weight (IBW), Male (kg) 79.27    % Ideal Body Weight 78.55    Usual Body Weight (UBW)    Usual Body Weight 65.8 kg (145 lb)    % Usual Body Weight 94.48    Weight Loss 3.629 kg (8 lb)   5-10lbs    % Weight Loss  5.5 %    Weight Loss Time Frame 3 months    Body Mass Index (BMI)    BMI (kg/m2) 19.15            Labs/Tests/Procedures/Meds       08/10/17 1530    Labs/Tests/Procedures/Meds    Labs/Tests Review Reviewed                Nutrition Prescription Ordered       08/10/17 1530    Nutrition Prescription PO    Current PO Diet Regular            Evaluation of Received Nutrient/Fluid Intake       08/10/17 1530    PO Evaluation    Number of Days PO Intake Evaluated Insufficient Data    Number of Meals 2    % PO Intake 38     Problem/Interventions:        Problem 1       08/10/17 1531    Nutrition Diagnoses Problem 1    Problem 1 " Unintended Weight Loss    Etiology (related to) Medical Diagnosis   Clinical condition    Signs/Symptoms (evidenced by) Unintended Weight Change    Unintended Weight Change Loss    Number of Pounds Lost 5-10 lbs    Weight loss time period 3 months              Intervention Goal       08/10/17 1531    Intervention Goal    General Nutrition support treatment    PO Increase intake    Weight --            Nutrition Intervention       08/10/17 1532    Nutrition Intervention    RD/Tech Action Advise alternate selection;Advise available snack;Encourage intake;Supplement offered/refused;Follow Tx progress;Care plan reviewd              Education/Evaluation       08/10/17 1532    Monitor/Evaluation    Monitor Per protocol;PO intake        Electronically signed by:  Sofia Hodges  08/10/17 4:11 PM

## 2017-08-10 NOTE — PROGRESS NOTES
[facility] Medicine Services  INPATIENT PROGRESS NOTE    Date of Admission: 8/9/2017  Length of Stay: 0  Primary Care Physician: Tim Doherty MD    Subjective   CC:  Weakness/FTT  HPI:    Daughter at bedside. Concerned about rash. No dyspnea. No pain per patient.    Review Of Systems:   Review of Systems  Demented, by no complaints    Objective      Temp:  [97.4 °F (36.3 °C)-98.4 °F (36.9 °C)] 97.5 °F (36.4 °C)  Heart Rate:  [] 84  Resp:  [14-20] 20  BP: ()/(35-81) 77/35  Physical Exam  NAD, but frail/thin ill appearing  PERRL  Neck supple  MMM  No LAD  Decreased at bases, otherwise clear  +BS, ND, NT  L flank ecchymoses/redness, no vesicles/blisters/scaling, and non-tender    Results Review:    I have reviewed the labs, radiology results and diagnostic studies.      Results from last 7 days  Lab Units 08/09/17 2024   WBC 10*3/mm3 16.61*   HEMOGLOBIN g/dL 11.4*   PLATELETS 10*3/mm3 290           Culture Data: Cultures:         Radiology Data:     I have reviewed the medications.    Assessment/Plan     Problem List  Hospital Problem List     * (Principal)Failure to thrive in adult (Chronic)    Valvular heart disease (Chronic)    Overview Signed 10/24/2016 12:05 PM by Perez Archer     1. Valvular heart disease:  a. Severe aortic stenosis, status post TAVR, 2013, data deficit, (performed at [facility]).           Dementia (Chronic)    Weakness (Chronic)    Leukocytosis    Anemia    Parkinson disease (Chronic)    Chronic systolic heart failure (Chronic)    Hypotension    Goals of care, counseling/discussion          Adult FTT  --focus on comfort  Rash  --does not appear to be shingles  Hypotension  --IVF        DVT prophylaxis:  None/comfort care  Discharge Planning: I expect patient to be discharged TBD.    Rogelio Sidhu MD   08/10/17   9:45 AM

## 2017-08-10 NOTE — PLAN OF CARE
Problem: Patient Care Overview (Adult)  Goal: Plan of Care Review  Outcome: Ongoing (interventions implemented as appropriate)    08/10/17 1550   Coping/Psychosocial Response Interventions   Plan Of Care Reviewed With patient   Patient Care Overview   Progress no change   Outcome Evaluation   Outcome Summary/Follow up Plan patient was transferred to inpatient hospice, on a waffle mattress, bp remains low          Problem: Dying Patient, Actively (Adult)  Goal: Identify Related Risk Factors and Signs and Symptoms  Outcome: Ongoing (interventions implemented as appropriate)    08/10/17 1550   Dying Patient, Actively   Actively Dying Patient: Related Risk Factors disease progression   Signs and Symptoms (Actively Dying Patient) vital sign changes;profound weakness       Goal: Comfort/Pain Control  Outcome: Ongoing (interventions implemented as appropriate)    08/10/17 1550   Dying Patient, Actively (Adult)   Comfort/Pain Control making progress toward outcome       Goal: Dying Process, Peace and Dignity  Outcome: Ongoing (interventions implemented as appropriate)    08/10/17 1550   Dying Patient, Actively (Adult)   Dying Process, Peace and Dignity making progress toward outcome         Problem: Fall Risk (Adult)  Goal: Identify Related Risk Factors and Signs and Symptoms  Outcome: Ongoing (interventions implemented as appropriate)    08/10/17 1550   Fall Risk   Fall Risk: Related Risk Factors age-related changes;history of falls;gait/mobility problems;environment unfamiliar   Fall Risk: Signs and Symptoms presence of risk factors       Goal: Absence of Falls  Outcome: Ongoing (interventions implemented as appropriate)    Problem: Pressure Ulcer (Adult)  Goal: Signs and Symptoms of Listed Potential Problems Will be Absent or Manageable (Pressure Ulcer)  Outcome: Ongoing (interventions implemented as appropriate)    08/10/17 1550   Pressure Ulcer   Problems Assessed (Pressure Ulcer) all   Problems Present (Pressure  Ulcer) wound progression/extension

## 2017-08-10 NOTE — SIGNIFICANT NOTE
RN called to report a rash wrapping from lower abdomen to back, asking if shingles possibility.    Came to eval. No vesicles present or prior crusted ones. Appears to have some yeast possibly - has nystatin ordered, and also appears to have injured/irritated skin. When asking pt although some confusion, he states the other day he was trying to get his pants on/off using the toilet and did scrape that area hard as well as hit it on something. Not bothersome to him at this time.    Monitor.    IV fluids not on. LCTA, no edema. Additional 500 ml bolus, then 50 ml/hr. SBP 80s. Will not give much more r/t pt more comfortable and relieved symptoms.    0415 - SBP 60s. RN reports family feels baseline is very low. He was hurting earlier tonight and did get pain medicine. Pt is calm, relaxed, comfortable. Can give another 500 ml bolus, then just maintenance. Do not want to fluid overload him. Treat to symptoms, not numbers. He already seems more alert and content when I saw him on his floor room later overnight to look at his core. Will not escalate any care. Hospice to see him today.

## 2017-08-10 NOTE — PLAN OF CARE
Problem: Fall Risk (Adult)  Goal: Absence of Falls  Outcome: Ongoing (interventions implemented as appropriate)    08/10/17 0424   Fall Risk (Adult)   Absence of Falls making progress toward outcome

## 2017-08-10 NOTE — PLAN OF CARE
Problem: Pain, Acute (Adult)  Goal: Acceptable Pain Control/Comfort Level  Outcome: Ongoing (interventions implemented as appropriate)    08/10/17 0423   Pain, Acute (Adult)   Acceptable Pain Control/Comfort Level making progress toward outcome

## 2017-08-10 NOTE — PLAN OF CARE
Problem: Skin Integrity Impairment, Risk/Actual (Adult)  Goal: Identify Related Risk Factors and Signs and Symptoms  Outcome: Ongoing (interventions implemented as appropriate)    08/10/17 0529   Skin Integrity Impairment, Risk/Actual   Skin Integrity Impairment, Risk/Actual: Related Risk Factors age extremes;immobility;tissue perfusion impaired;sensory impairment   Signs and Symptoms (Skin Integrity Impairment) rash;undermining

## 2017-08-10 NOTE — PLAN OF CARE
Problem: Pain, Acute (Adult)  Goal: Identify Related Risk Factors and Signs and Symptoms  Outcome: Ongoing (interventions implemented as appropriate)    08/10/17 2444   Pain, Acute   Related Risk Factors (Acute Pain) disease process;positioning;psychosocial factor;terminal illness   Signs and Symptoms (Acute Pain) fatigue/weakness;guarding/abnormal posturing/positioning;verbalization of pain descriptors

## 2017-08-11 VITALS
SYSTOLIC BLOOD PRESSURE: 77 MMHG | BODY MASS INDEX: 19.18 KG/M2 | HEIGHT: 71 IN | OXYGEN SATURATION: 99 % | RESPIRATION RATE: 16 BRPM | WEIGHT: 137 LBS | HEART RATE: 91 BPM | DIASTOLIC BLOOD PRESSURE: 57 MMHG | TEMPERATURE: 97.8 F

## 2017-08-11 RX ORDER — BISACODYL 5 MG/1
5 TABLET, DELAYED RELEASE ORAL DAILY PRN
Refills: 0
Start: 2017-08-11

## 2017-08-11 RX ORDER — SENNA AND DOCUSATE SODIUM 50; 8.6 MG/1; MG/1
2 TABLET, FILM COATED ORAL NIGHTLY PRN
Start: 2017-08-11

## 2017-08-11 RX ORDER — PSEUDOEPHEDRINE HCL 30 MG
100 TABLET ORAL 2 TIMES DAILY PRN
Refills: 0
Start: 2017-08-11

## 2017-08-11 RX ORDER — SIMETHICONE 80 MG
80 TABLET,CHEWABLE ORAL 4 TIMES DAILY PRN
Refills: 0
Start: 2017-08-11

## 2017-08-11 RX ORDER — POTASSIUM CHLORIDE 750 MG/1
10 TABLET, FILM COATED, EXTENDED RELEASE ORAL DAILY
Start: 2017-08-11 | End: 2017-08-11 | Stop reason: HOSPADM

## 2017-08-11 RX ORDER — ONDANSETRON 2 MG/ML
4 INJECTION INTRAMUSCULAR; INTRAVENOUS EVERY 6 HOURS PRN
Start: 2017-08-11

## 2017-08-11 RX ADMIN — FLUDROCORTISONE ACETATE 100 MCG: 0.1 TABLET ORAL at 08:49

## 2017-08-11 RX ADMIN — LEVOTHYROXINE SODIUM 50 MCG: 50 TABLET ORAL at 05:54

## 2017-08-11 RX ADMIN — NYSTATIN: 100000 CREAM TOPICAL at 08:50

## 2017-08-11 RX ADMIN — CARBIDOPA AND LEVODOPA 1 TABLET: 25; 100 TABLET, EXTENDED RELEASE ORAL at 08:49

## 2017-08-11 NOTE — DISCHARGE SUMMARY
[facility] Medicine Services  DISCHARGE SUMMARY       Date of Admission: 8/9/2017  Date of Discharge:  8/11/2017  Primary Care Physician: Tim Doherty MD  Consulting Physician(s)          None           Discharge Diagnoses:  Active Hospital Problems (** Indicates Principal Problem)    Diagnosis Date Noted   • **Failure to thrive in adult [R62.7] 08/09/2017   • Goals of care, counseling/discussion [Z71.89] 08/10/2017   • Weakness [R53.1] 08/09/2017   • Leukocytosis [D72.829] 08/09/2017   • Anemia [D64.9] 08/09/2017   • Parkinson disease [G20] 08/09/2017   • Chronic systolic heart failure [I50.22] 08/09/2017   • Hypotension [I95.9] 08/09/2017   • Valvular heart disease [I38]      1. Valvular heart disease:  a. Severe aortic stenosis, status post TAVR, 2013, data deficit, (performed at [facility]).       • Dementia [F03.90]       Resolved Hospital Problems    Diagnosis Date Noted Date Resolved   No resolved problems to display.       Presenting Problem/History of Present Illness  Failure to thrive in adult [R62.7]     Chief Complaint on Day of Discharge:     History of Present Illness on Day of Discharge:     Hospital Course  Patient is a 85 y.o. male presented with:    Adult FTT  Hypotension    The patient was admitted with severe hypotension and adult FTT. The family wished to focus on comfort. The patient had home hospice care.    Procedures Performed         Consults:   Consults     No orders found from 7/11/2017 to 8/10/2017.          Pertinent Test Results:      Results        Procedure Component Value Units Date/Time       [place] Draw [[id number]] Collected: 08/09/17 2024       Lab Status: Final result Specimen: Blood Updated: 08/09/17 8488       Narrative:         The following orders were created for panel order [place] Draw.  Procedure                               Abnormality         Status                     ---------                               -----------          "------                     Light Blue Top[[id number]]                                   Final result               Green Top (Gel)[[id number]]                                                             Lavender Top[[id number]]                                     Final result               Gold Top - SST[[id number]]                                                              Green Top (No Gel)[[id number]]                                                            Please view results for these tests on the individual orders.       CBC Auto Differential [[id number]] (Abnormal) Collected: 08/09/17 2024       Lab Status: Final result Specimen: Blood Updated: 08/09/17 2216        WBC 16.61 (H) 10*3/mm3         RBC 3.50 (L) 10*6/mm3         Hemoglobin 11.4 (L) g/dL         Hematocrit 33.1 (L) %         MCV 94.6 fL         MCH 32.6 (H) pg         MCHC 34.4 g/dL         RDW 15.8 (H) %         RDW-SD 54.8 (H) fl         MPV 10.2 fL         Platelets 290 10*3/mm3        Scan Slide [[id number]] Collected: 08/09/17 2024       Lab Status: Final result Specimen: Blood Updated: 08/09/17 2216        Scan Slide --       Slide Review, Hematology [[id number]] Collected: 08/09/17 2024       Lab Status: Final result Specimen: Blood Updated: 08/10/17 1314        Performed by:  Gary Wong M.D.           Pathologist Interpretation --        Lymphocytosis consisting of mature appearing lymphocytes with           Condition on Discharge:  Stable/guarded    Physical Exam on Discharge:BP (!) 77/35 (BP Location: Right arm, Patient Position: Lying)  Pulse 84  Temp 97.5 °F (36.4 °C) (Axillary)   Resp 20  Ht 71\" (180.3 cm)  Wt 137 lb (62.1 kg)  SpO2 95%  BMI 19.11 kg/m2  Physical Exam  NAD, but frail/thin ill appearing  PERRL  Neck supple  MMM  No LAD  Decreased at bases, otherwise clear  +BS, ND, NT  L flank ecchymoses/redness, no vesicles/blisters/scaling, and non-tender    Discharge Disposition  Hospice/Medical Facility ([facility] - [facility])    Discharge Medications   JeremieRodriRiomarianne ENNIS Jr.   Home Medication Instructions LESIA:[id number]    Printed on:08/11/17 1230   Medication Information                      acetaminophen (TYLENOL) 325 MG tablet  Take 2 tablets by mouth Every 6 (Six) Hours As Needed for Mild Pain (1-3).             aspirin 81 MG tablet  Take 1 tablet by mouth daily.             bumetanide (BUMEX) 0.5 MG tablet  Take 1 tablet by mouth Daily.             carbidopa-levodopa ER (SINEMET CR)  MG per tablet  Take 1 tablet by mouth 2 (Two) Times a Day.             carvedilol (COREG) 3.125 MG tablet  Take 1 tablet by mouth 2 (Two) Times a Day With Meals.             cyanocobalamin 1000 MCG/ML injection  Inject 1 mL into the shoulder, thigh, or buttocks Every 28 (Twenty-Eight) Days.             Ergocalciferol (VITAMIN D2) 2000 UNITS tablet  Take 1 tablet by mouth daily.             fludrocortisone 0.1 MG tablet  Take 1 tablet by mouth 2 (Two) Times a Day.             folic acid (FOLVITE) 800 MCG tablet  Take 1 tablet by mouth daily.             HYDROcodone-acetaminophen (NORCO) 5-325 MG per tablet  Take 1 tablet by mouth Every 12 (Twelve) Hours As Needed for Moderate Pain .             levothyroxine (SYNTHROID, LEVOTHROID) 50 MCG tablet  Take 1 tablet by mouth Daily.             lisinopril (PRINIVIL,ZESTRIL) 2.5 MG tablet  Take 1 tablet by mouth Daily.             nystatin (MYCOSTATIN) 587579 UNIT/GM cream  Apply  topically 2 (Two) Times a Day for 30 days.             omeprazole (priLOSEC) 20 MG capsule  Take 1 capsule by mouth Daily.             potassium chloride ER (K-TAB) 20 MEQ tablet controlled-release ER tablet  Take 1 tablet by mouth Daily.             spironolactone (ALDACTONE) 25 MG tablet  Take 1 tablet by mouth Daily.             Syringe 25G X 5/8\" 3 ML misc  1 syringe Every 28 (Twenty-Eight) Days.                 Discharge Diet:  As tolerated    Discharge Care Plan / Instructions:    Activity at Discharge:  As " tolerated    Follow-up Appointments  No future appointments.      Test Results Pending at Discharge       Rogelio Sidhu MD 08/11/17 12:35 PM    Time:  35 minutes    Please note that portions of this note may have been completed with a voice recognition program. Efforts were made to edit the dictations, but occasionally words are mistranscribed.

## 2017-08-11 NOTE — PLAN OF CARE
Problem: Dying Patient, Actively (Adult)  Goal: Comfort/Pain Control  Outcome: Ongoing (interventions implemented as appropriate)    08/11/17 0401   Dying Patient, Actively (Adult)   Comfort/Pain Control making progress toward outcome       Goal: Dying Process, Peace and Dignity  Outcome: Ongoing (interventions implemented as appropriate)    08/11/17 0401   Dying Patient, Actively (Adult)   Dying Process, Peace and Dignity making progress toward outcome

## 2017-08-11 NOTE — PLAN OF CARE
Problem: Patient Care Overview (Adult)  Goal: Plan of Care Review  Outcome: Ongoing (interventions implemented as appropriate)    08/11/17 1500   Coping/Psychosocial Response Interventions   Plan Of Care Reviewed With daughter   Patient Care Overview   Progress unable to show any progress toward functional goals         Problem: Dying Patient, Actively (Adult)  Goal: Identify Related Risk Factors and Signs and Symptoms  Outcome: Ongoing (interventions implemented as appropriate)    08/11/17 1500   Dying Patient, Actively   Actively Dying Patient: Related Risk Factors delirium;disease progression;level of consciousness   Signs and Symptoms (Actively Dying Patient) agitation/restlessness;total dependency         Problem: Fall Risk (Adult)  Goal: Identify Related Risk Factors and Signs and Symptoms  Outcome: Ongoing (interventions implemented as appropriate)    08/11/17 1500   Fall Risk   Fall Risk: Related Risk Factors age-related changes;confusion/agitation;fear of falling;gait/mobility problems   Fall Risk: Signs and Symptoms presence of risk factors         Problem: Pressure Ulcer (Adult)  Goal: Signs and Symptoms of Listed Potential Problems Will be Absent or Manageable (Pressure Ulcer)  Outcome: Ongoing (interventions implemented as appropriate)    08/11/17 1500   Pressure Ulcer   Problems Assessed (Pressure Ulcer) all   Problems Present (Pressure Ulcer) pain;wound progression/extension

## 2017-08-11 NOTE — H&P
HOSPICE H&P  8/10/17    Chief Complaint: weakness, failure to thrive.     History of Present Illness:      Mr. Jeremie Seay was sent to the ED after his assisted living facility called EMS due to his generalized weakness.  He has actually had failure to thrive with more decline in recent months and is a home hospice pt. He is noted to have advancing parkinsons disease with progressing dementia, and multiple uncurable comorbidities. Upon evaluation, he was found to be notably hypotensive with SBP 60-80 range.     Mr. Chao's daughter Caryn reports they are working on getting pt into a LTC bed ASAP due to his chronic decline and inability to live alone. She has met with Destiny Herrera and they were to let her know when a bed will be available for him. She has stated that they are looking for purely comfort care while here but ok for pt to have more IV fluids as it seems to have helped pt feel better. She also wants to ensure pt continues to receive tylenol for his chronic pain.     Mr. Chao was initially admitted to the Hospitalist service at [facility] for further care and Inpt Hospice Team has been consulted to assume his care as pt and family do wish to remain on Hospice with a comfort care focus.    At initial visit, pt agrees he is feeling better than when he first arrived in the ED.  He denies pain, dyspnea, nausea, or vomiting.  He does appear to have a rash that traverses his low abdomen and back.  It does not itch and is not painful.  There is no drainage.  The pt and family deny any new recent medications or product use, no new known environmental exposures.    Past Medical History:   Diagnosis Date   • Adrenal insufficiency    • Anemia 8/9/2017   • Aortic stenosis    • Chronic heart failure 8/9/2017   • Congestive heart failure    • Failure to thrive in adult 8/9/2017   • Gait disorder     Chronic gait unsteadiness.   • GERD (gastroesophageal reflux disease)    • Goals of care, counseling/discussion  8/10/2017   • Hearing loss    • Humeral surgical neck fracture     Recent left humeral neck fracture, 12/09/2015, deemed nonoperative, treated with sling x4 weeks duration.   • Hypogonadism male    • Hypokalemia    • Hyponatremia    • Hypotension 8/9/2017   • Hypothyroidism    • Left humeral fracture 12/2015   • Leukocytosis 8/9/2017   • Lower extremity edema    • Mild dementia    • Osteoarthrosis    • Parkinson disease 8/9/2017   • Seasonal allergic rhinitis    • Sick sinus syndrome    • Systolic heart failure    • Tear of right biceps muscle    • Valvular heart disease    • Venous insufficiency    • Vitamin B12 deficiency    • Vitamin D deficiency    • Weakness 8/9/2017     Past Surgical History:   Procedure Laterality Date   • AORTIC VALVE REPAIR/REPLACEMENT  07/2013    TAVR    • CARDIAC CATHETERIZATION  2013   • CARDIAC PACEMAKER PLACEMENT     • KNEE SURGERY       Current Facility-Administered Medications   Medication Dose Route Frequency Provider Last Rate Last Dose   • acetaminophen (TYLENOL) 160 MG/5ML solution 650 mg  650 mg Oral Q4H PRN DEANA Guevara       • bisacodyl (DULCOLAX) EC tablet 5 mg  5 mg Oral Daily PRN DEANA Guevara       • carbidopa-levodopa ER (SINEMET CR)  MG per tablet 1 tablet  1 tablet Oral BID DEANA Guevara   1 tablet at 08/10/17 1810   • docusate sodium (COLACE) capsule 100 mg  100 mg Oral BID PRN DEANA Guevara       • fludrocortisone tablet 100 mcg  100 mcg Oral Q12H Veronica Min APRN   100 mcg at 08/10/17 2055   • HYDROcodone-acetaminophen (NORCO) 5-325 MG per tablet 0.5 tablet  0.5 tablet Oral Q4H PRN Veronica Min APRN   0.5 tablet at 08/10/17 1652    Or   • HYDROcodone-acetaminophen (NORCO) 5-325 MG per tablet 1 tablet  1 tablet Oral Q4H PRN Veronica Min APRN   1 tablet at 08/10/17 2055   • levothyroxine (SYNTHROID, LEVOTHROID) tablet 50 mcg  50 mcg Oral Q AM Veronica Min APRN   50 mcg at 08/10/17 1439   • melatonin sublingual  "tablet 5 mg  5 mg Sublingual Nightly PRN DEANA Guevara       • nystatin (MYCOSTATIN) 680724 UNIT/GM cream   Topical BID DEANA Guevara       • ondansetron (ZOFRAN) injection 4 mg  4 mg Intravenous Q6H PRN DEANA Guevara       • sennosides-docusate sodium (SENOKOT-S) 8.6-50 MG tablet 2 tablet  2 tablet Oral Nightly PRN DEANA Guevara       • simethicone (MYLICON) chewable tablet 80 mg  80 mg Oral 4x Daily PRN DEANA Guevara              Allergies   Allergen Reactions   • Penicillins Anaphylaxis     Family History   Problem Relation Age of Onset   • Heart disease Father    • Stroke Father      Social History     Social History   • Marital status:      Spouse name: N/A   • Number of children: 2   • Years of education: Ph.D.     Occupational History   • Professor Retired     Social History Main Topics   • Smoking status: Never Smoker   • Smokeless tobacco: Never Used   • Alcohol use No   • Drug use: No   • Sexual activity: No     Other Topics Concern   • Not on file     Social History Narrative    Mr. Janee Seay is a pleasant 85 year old white  male. His daughter and son are his healthcare surrogates. Mr. Chao joined hospice on 8/8/17 - they are looking for purely comfort care, and in process of transitioning pt from assisted living at [facility] to their LTC building.     Review of Systems - unable to obtain from pt due to dementia/poor historian      BP (!) 77/51 (BP Location: Left arm, Patient Position: Lying)  Pulse 85  Temp 97.6 °F (36.4 °C) (Oral)   Resp 20  Ht 71\" (180.3 cm)  Wt 137 lb (62.1 kg)  SpO2 95%  BMI 19.11 kg/m2    Intake/Output Summary (Last 24 hours) at 08/10/17 2355  Last data filed at 08/10/17 1800   Gross per 24 hour   Intake             2440 ml   Output              825 ml   Net             1615 ml     Physical Exam:  Constitutional: No distress. Frail, thin elderly male in bed.  Head: Normocephalic and atraumatic. "   Mouth/Throat: Moist oral mucosa  Eyes: Conjunctivae and EOM are normal. Pupils are equal. No scleral icterus.   Neck: No JVD present. No tracheal deviation present.   Cardiovascular: Normal rate, regular rhythm, normal heart sounds and intact distal pulses.    Pulmonary/Chest: Clear bilaterally. No respiratory distress.   Abdominal: Soft. Nontender. Nondistended. Bowel sounds active.   Musculoskeletal: No edema or deformity.   Neurological: Awake, alert, oriented to person.  No focal deficit.  No myoclonus.  Skin: Warm and dry.  Rash at left low abdomen, extending around flank to back.  Looks dermatomal in distribution but not vesicular.  No drainage.  Psychiatric: Calm, pleasant, relaxed.      Results from last 7 days  Lab Units 08/09/17 2024   WBC 10*3/mm3 16.61*   HEMOGLOBIN g/dL 11.4*   HEMATOCRIT % 33.1*   PLATELETS 10*3/mm3 290       Imaging Results (last 72 hours)     ** No results found for the last 72 hours. **        Active Hospital Problems (** Indicates Principal Problem)     Diagnosis Date Noted   • **Failure to thrive in adult [R62.7] 08/09/2017   • Goals of care, counseling/discussion [Z71.89] 08/10/2017   • Weakness [R53.1] 08/09/2017   • Leukocytosis [D72.829] 08/09/2017   • Anemia [D64.9] 08/09/2017   • Parkinson disease [G20] 08/09/2017   • Chronic systolic heart failure [I50.22] 08/09/2017   • Hypotension [I95.9] 08/09/2017   • Valvular heart disease [I38]         1. Valvular heart disease:  a. Severe aortic stenosis, status post TAVR, 2013, data deficit, (performed at [facility]).      • Dementia [F03.90]       Assessment / Plan:     1. Failure to thrive / weakness:  - Chronic issue, exacerbated by notable hypotension.  - DNR, comfort measures only.  - s/p IV fluids..     2. Hypotension:  - s/p IVF in ED and on hospital medicine service.  - No escalation of care.  - Hold BB, ACEI, bumex, and aldactone. Will not need all these meds at discharge.     3. Parkinson's disease /  dementia:  - Home hospice pt.     4. Chronic systolic heart failure / valvular heart disease:  - Chronic meds held at this point given hypotension.     5. Leukocytosis:  - No evidence of PNA or UTI per w/u.     6. Anemia:  - Comfort care.     7. Goals of care:  - Pt has a Living will.  Daughter and son are health care surrogates.  - DNR, comfort measures only.   -Admit to inpt Hospice with plan to d/c soon to LTC at [facility] with continued Hospice care.    *Appropriate for GIP care due to need for frequent skilled nursing assessments and interventions for pt comfort.  Pt cannot make his needs known.  Goal is comfort care with Hospice.  Working on appropriate safe disposition.    Code Status: DO NOT RESUSCITATE.   Healthcare surrogates: daughter Caryn Brannon and son Rio Chao III.    Rafaela Goel MD  08/10/17

## 2017-08-11 NOTE — PROGRESS NOTES
Continued Stay Note  [facility]     Patient Name: Rio Chao Jr.  MRN: [id number]  Today's Date: 8/11/2017    Admit Date: 8/10/2017          Discharge Plan       08/11/17 1447    Case Management/Social Work Plan    Plan [facility]    Additional Comments Chart reviewed.  Pt lying awake in bed.  No complaints.  Pt discharging to [facility] today via ambulance.   time is 1700.  [facility] team aware of plan as well as pt's ángelrCaryn.  If hospice RN can be of further assist, call ext [phone].               Discharge Codes     None            Talita Del Cid RN

## 2017-08-11 NOTE — PLAN OF CARE
Problem: Dying Patient, Actively (Adult)  Goal: Identify Related Risk Factors and Signs and Symptoms  Outcome: Ongoing (interventions implemented as appropriate)    08/11/17 0400   Dying Patient, Actively   Actively Dying Patient: Related Risk Factors disease progression;level of consciousness;rate of onset of illness;anticipatory loss   Signs and Symptoms (Actively Dying Patient) agitation/restlessness;total dependency;frequently disoriented;pain;drowsiness for extended period;vital sign changes;profound weakness         Problem: Fall Risk (Adult)  Goal: Identify Related Risk Factors and Signs and Symptoms  Outcome: Ongoing (interventions implemented as appropriate)    08/11/17 0400   Fall Risk   Fall Risk: Related Risk Factors age-related changes;confusion/agitation;history of falls;sensory deficits;sleep pattern alteration;environment unfamiliar   Fall Risk: Signs and Symptoms presence of risk factors       Goal: Absence of Falls  Outcome: Ongoing (interventions implemented as appropriate)    08/11/17 0400   Fall Risk (Adult)   Absence of Falls making progress toward outcome

## 2017-08-11 NOTE — DISCHARGE SUMMARY
Date of Discharge:  8/11/2017    Discharge Diagnosis: PD    Presenting Problem/History of Present Illness    Hospital Course  Patient is a 85 y.o. male presented with adult FTT and hypotension. Pt is a home Hospice pt and was transferred to in Hospice care for symptom mgmt.     Consults:   Consults     No orders found for last 30 day(s).        Condition on Discharge: Hospice pt    Vital Signs  Temp:  [97.4 °F (36.3 °C)-97.7 °F (36.5 °C)] 97.6 °F (36.4 °C)  Heart Rate:  [81-89] 87  Resp:  [16-20] 16  BP: (75-94)/(35-63) 86/35    Physical Exam  NAD, but frail/thin ill appearing  PERRL  Neck supple  No MRG  No LAD  Decreased at bases, otherwise clear  +BS, ND, NT  L flank ecchymoses/redness, no vesicles/blisters/scaling, and non-tender    Discharge Disposition      Discharge Medications   Rio Chao Jr.   Home Medication Instructions LESIA:[id number]    Printed on:08/11/17 8718   Medication Information                      acetaminophen (TYLENOL) 325 MG tablet  Take 2 tablets by mouth Every 6 (Six) Hours As Needed for Mild Pain (1-3).             bisacodyl (DULCOLAX) 5 MG EC tablet  Take 1 tablet by mouth Daily As Needed for Constipation.             carbidopa-levodopa ER (SINEMET CR)  MG per tablet  Take 1 tablet by mouth 2 (Two) Times a Day.             docusate sodium 100 MG capsule  Take 100 mg by mouth 2 (Two) Times a Day As Needed for Constipation.             fludrocortisone 0.1 MG tablet  Take 1 tablet by mouth 2 (Two) Times a Day.             HYDROcodone-acetaminophen (NORCO) 5-325 MG per tablet  Take 1 tablet by mouth Every 12 (Twelve) Hours As Needed for Moderate Pain .             levothyroxine (SYNTHROID, LEVOTHROID) 50 MCG tablet  Take 1 tablet by mouth Daily.             melatonin 5 MG sublingual tablet sublingual tablet  Place 1 tablet under the tongue At Night As Needed (sleep).             ondansetron (ZOFRAN) 4 MG/2ML injection  Infuse 2 mL into a venous catheter Every 6 (Six) Hours  As Needed for Nausea or Vomiting.             sennosides-docusate sodium (SENOKOT-S) 8.6-50 MG tablet  Take 2 tablets by mouth At Night As Needed for Constipation.             simethicone (MYLICON) 80 MG chewable tablet  Chew 1 tablet 4 (Four) Times a Day As Needed for Flatulence.                 Discharge Diet: Regular    Activity at Discharge: as pt tolerates; mostly bedbound    Follow-up Appointments  No future appointments.    Of note, Pt with hx of erythematous skin lesion on Left flank wrapping around to abd. This area is not an area of concern for active process; this is not contagious. No pain, vesicles, crusting. Appears excoriated. Continue barrier cream.     Also, d/t hypotension and dehydration the following were discontinued upon discharge and may need to be restarted at a later date:  1. Spironolactone 25mg daily  2. Bumex 0.5mg daily  3. Potassium 10meq daily  4. Lisinopril 2.5mg daily  5. Carvedilol 3.125mg BID    DEANA Guevara  08/11/17  3:09 PM    Time: 45 minutes

## 2017-08-14 NOTE — H&P
Patient  before being evaluated by Hospice provider    ADDENDUM:  Correction.  Pt was seen by Dr. Goel on 8/10/17 and admitted to the [facility] service.  He was a live discharge the following day to a nursing home bed.  Rafaela Goel MD

## 2018-11-27 NOTE — PROGRESS NOTES
Continued Stay Note  [facility]     Patient Name: Rio Chao Jr.  MRN: [id number]  Today's Date: 4/3/2017    Admit Date: 3/24/2017          Discharge Plan       04/03/17 0842    Case Management/Social Work Plan    Plan [facility]    Patient/Family In Agreement With Plan yes    Additional Comments Per progress note, patient should be medically ready for transfer to [facility] today for rehab. Call to Elizabeth at [facility], she states they are able to accept patient to a skilled bed today, insurance has approved. Daughter Caryn states she is able to transport patient via car and will be here around 2pm to transport patient. Nurse to call report to [phone], ask for unit 1 and fax transfer summary to [phone]. Please send copied chart, scripts and transfer summary. CM continues to follow.               Discharge Codes     None        Expected Discharge Date and Time     Expected Discharge Date Expected Discharge Time    Apr 3, 2017             Beatriz Miguel RN     other/age(85 years old or older)